# Patient Record
Sex: FEMALE | Race: WHITE | NOT HISPANIC OR LATINO | Employment: FULL TIME | URBAN - METROPOLITAN AREA
[De-identification: names, ages, dates, MRNs, and addresses within clinical notes are randomized per-mention and may not be internally consistent; named-entity substitution may affect disease eponyms.]

---

## 2023-07-19 ENCOUNTER — OFFICE VISIT (OUTPATIENT)
Dept: FAMILY MEDICINE CLINIC | Facility: CLINIC | Age: 28
End: 2023-07-19

## 2023-07-19 VITALS
WEIGHT: 111 LBS | HEART RATE: 73 BPM | SYSTOLIC BLOOD PRESSURE: 104 MMHG | TEMPERATURE: 98.4 F | DIASTOLIC BLOOD PRESSURE: 62 MMHG | OXYGEN SATURATION: 99 % | BODY MASS INDEX: 20.43 KG/M2 | RESPIRATION RATE: 17 BRPM | HEIGHT: 62 IN

## 2023-07-19 DIAGNOSIS — Z00.00 ANNUAL PHYSICAL EXAM: ICD-10-CM

## 2023-07-19 DIAGNOSIS — Z76.89 ENCOUNTER TO ESTABLISH CARE: ICD-10-CM

## 2023-07-19 DIAGNOSIS — Z11.1 SCREENING-PULMONARY TB: Primary | ICD-10-CM

## 2023-07-19 PROCEDURE — 86580 TB INTRADERMAL TEST: CPT | Performed by: NURSE PRACTITIONER

## 2023-07-19 PROCEDURE — 99385 PREV VISIT NEW AGE 18-39: CPT | Performed by: NURSE PRACTITIONER

## 2023-07-19 NOTE — PROGRESS NOTES
[facility]    NAME: Marian Ramirez  AGE: 32 y.o. SEX: female  : 1995     DATE: 2023     Assessment and Plan:     Problem List Items Addressed This Visit    None  Visit Diagnoses     Screening-pulmonary TB    -  Primary    Relevant Orders    TB Skin Test (Completed)    Encounter to establish care        Annual physical exam            RTC on Friday for PPD read w/ nurse       Immunizations and preventive care screenings were discussed with patient today. Appropriate education was printed on patient's after visit summary. Counseling:  Alcohol/drug use: discussed moderation in alcohol intake, the recommendations for healthy alcohol use, and avoidance of illicit drug use. Dental Health: discussed importance of regular tooth brushing, flossing, and dental visits. Injury prevention: discussed safety/seat belts, safety helmets, smoke detectors, carbon dioxide detectors, and smoking near bedding or upholstery. Sexual health: discussed sexually transmitted diseases, partner selection, use of condoms, avoidance of unintended pregnancy, and contraceptive alternatives. · Exercise: the importance of regular exercise/physical activity was discussed. Recommend exercise 3-5 times per week for at least 30 minutes. Depression Screening and Follow-up Plan: Patient was screened for depression during today's encounter. They screened negative with a PHQ-2 score of 0. No follow-ups on file. Chief Complaint:     Chief Complaint   Patient presents with   • New Patient Visit     Physical form jamie out       History of Present Illness:     Adult Annual Physical   Patient here for a comprehensive physical exam and to establish care. The patient recently moved to the area. 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 She has no significant PMH/PSH. Requires PPD placement for TB screening.  Reports last pap several years ago but has appointment with GYN scheduled next month. Had labs less than a year ago w/o abnormality that she is aware of, will give consent for Care Everywhere at checkout. Declines STI testing today. The patient reports no problems. Diet and Physical Activity  · Diet/Nutrition: well balanced diet. · Exercise: no formal exercise. Depression Screening  PHQ-2/9 Depression Screening    Little interest or pleasure in doing things: 0 - not at all  Feeling down, depressed, or hopeless: 0 - not at all  PHQ-2 Score: 0  PHQ-2 Interpretation: Negative depression screen       General Health  · Sleep: sleeps well. · Hearing: normal - bilateral.  · Vision: goes for regular eye exams. · Dental: regular dental visits. /GYN Health  · Last menstrual period: 7/4/2023  · Contraceptive method: none. · History of STDs?: no.     Review of Systems:     Review of Systems   Constitutional: Negative for activity change, appetite change, chills, fatigue, fever and unexpected weight change. HENT: Negative for hearing loss, nosebleeds, sinus pain, sneezing, sore throat and trouble swallowing. Eyes: Negative for photophobia and visual disturbance. Respiratory: Negative for cough, chest tightness, shortness of breath and wheezing. Cardiovascular: Negative for chest pain, palpitations and leg swelling. Gastrointestinal: Negative for abdominal pain, constipation, nausea and vomiting. Genitourinary: Negative for decreased urine volume, difficulty urinating, dysuria, flank pain, genital sores, hematuria and urgency. Musculoskeletal: Negative for back pain and gait problem. Skin: Negative for pallor, rash and wound. Neurological: Negative for dizziness, seizures, syncope, weakness, numbness and headaches. Hematological: Negative for adenopathy. Does not bruise/bleed easily. Psychiatric/Behavioral: Negative for confusion, hallucinations, self-injury, sleep disturbance and suicidal ideas.  The patient is not nervous/anxious. Past Medical History:     No past medical history on file. Past Surgical History:     No past surgical history on file. Social History:     Social History     Socioeconomic History   • Marital status: /Civil Union     Spouse name: None   • Number of children: None   • Years of education: None   • Highest education level: None   Occupational History   • None   Tobacco Use   • Smoking status: Never   • Smokeless tobacco: Never   Substance and Sexual Activity   • Alcohol use: Never   • Drug use: Never   • Sexual activity: None   Other Topics Concern   • None   Social History Narrative   • None     Social Determinants of Health     Financial Resource Strain: Low Risk  (7/19/2023)    Overall Financial Resource Strain (CARDIA)    • Difficulty of Paying Living Expenses: Not hard at all   Food Insecurity: No Food Insecurity (7/19/2023)    Hunger Vital Sign    • Worried About Running Out of Food in the Last Year: Never true    • Ran Out of Food in the Last Year: Never true   Transportation Needs: No Transportation Needs (7/19/2023)    PRAPARE - Transportation    • Lack of Transportation (Medical): No    • Lack of Transportation (Non-Medical): No   Physical Activity: Not on file   Stress: Not on file   Social Connections: Not on file   Intimate Partner Violence: Not on file   Housing Stability: Not on file      Family History:     No family history on file. Current Medications:     No current outpatient medications on file. No current facility-administered medications for this visit. Allergies:     No Known Allergies   Physical Exam:     /62 (BP Location: Left arm, Patient Position: Sitting, Cuff Size: Standard)   Pulse 73   Temp 98.4 °F (36.9 °C) (Temporal)   Resp 17   Ht 5' 2" (1.575 m)   Wt 50.3 kg (111 lb)   SpO2 99%   BMI 20.30 kg/m²     Physical Exam  Vitals and nursing note reviewed. Constitutional:       General: She is not in acute distress. Appearance: Normal appearance. She is not diaphoretic. HENT:      Head: Normocephalic. Right Ear: Tympanic membrane and external ear normal.      Left Ear: Tympanic membrane and external ear normal.      Nose: Nose normal.      Mouth/Throat:      Mouth: Mucous membranes are moist.   Eyes:      General:         Right eye: No discharge. Left eye: No discharge. Extraocular Movements: Extraocular movements intact. Conjunctiva/sclera: Conjunctivae normal.      Pupils: Pupils are equal, round, and reactive to light. Cardiovascular:      Rate and Rhythm: Normal rate and regular rhythm. Pulses: Normal pulses. Heart sounds: Normal heart sounds. Pulmonary:      Effort: Pulmonary effort is normal. No respiratory distress. Breath sounds: Normal breath sounds. Abdominal:      General: Bowel sounds are normal. There is no distension. Palpations: Abdomen is soft. Musculoskeletal:         General: Normal range of motion. Cervical back: Normal range of motion and neck supple. No rigidity. Right lower leg: No edema. Left lower leg: No edema. Lymphadenopathy:      Cervical: No cervical adenopathy. Skin:     General: Skin is warm and dry. Capillary Refill: Capillary refill takes less than 2 seconds. Findings: No rash. Neurological:      General: No focal deficit present. Mental Status: She is alert and oriented to person, place, and time.    Psychiatric:         Mood and Affect: Mood normal.         Behavior: Behavior normal.          Reggie Armas, [facility]

## 2023-07-21 ENCOUNTER — TELEPHONE (OUTPATIENT)
Dept: FAMILY MEDICINE CLINIC | Facility: CLINIC | Age: 28
End: 2023-07-21

## 2023-07-21 ENCOUNTER — CLINICAL SUPPORT (OUTPATIENT)
Dept: FAMILY MEDICINE CLINIC | Facility: CLINIC | Age: 28
End: 2023-07-21

## 2023-07-21 DIAGNOSIS — Z11.1 ENCOUNTER FOR PPD SKIN TEST READING: Primary | ICD-10-CM

## 2023-07-21 LAB
INDURATION: 0 MM
TB SKIN TEST: NEGATIVE

## 2023-09-05 ENCOUNTER — TELEPHONE (OUTPATIENT)
Dept: OBGYN CLINIC | Facility: CLINIC | Age: 28
End: 2023-09-05

## 2023-09-05 NOTE — TELEPHONE ENCOUNTER
Patient called and left message requesting a New OB appt. Called patient back and left her a voicemail requesting a call back for NOB appt and her LMP.

## 2023-09-05 NOTE — TELEPHONE ENCOUNTER
Patient called, left message on answering machine, hx of miscarriage in April 2023. NOB is scheduled.

## 2023-09-07 ENCOUNTER — NURSE TRIAGE (OUTPATIENT)
Dept: OTHER | Facility: OTHER | Age: 28
End: 2023-09-07

## 2023-09-08 ENCOUNTER — APPOINTMENT (OUTPATIENT)
Dept: LAB | Facility: HOSPITAL | Age: 28
End: 2023-09-08
Payer: COMMERCIAL

## 2023-09-08 DIAGNOSIS — O20.9 BLEEDING IN EARLY PREGNANCY: ICD-10-CM

## 2023-09-08 DIAGNOSIS — Z87.59 HISTORY OF MISCARRIAGE: ICD-10-CM

## 2023-09-08 DIAGNOSIS — O20.9 BLEEDING IN EARLY PREGNANCY: Primary | ICD-10-CM

## 2023-09-08 LAB
ABO GROUP BLD: NORMAL
B-HCG SERPL-ACNC: 26 MIU/ML (ref 0–5)
BLD GP AB SCN SERPL QL: NEGATIVE
PROGEST SERPL-MCNC: 0.38 NG/ML
RH BLD: POSITIVE
SPECIMEN EXPIRATION DATE: NORMAL

## 2023-09-08 PROCEDURE — 84702 CHORIONIC GONADOTROPIN TEST: CPT

## 2023-09-08 PROCEDURE — 86900 BLOOD TYPING SEROLOGIC ABO: CPT

## 2023-09-08 PROCEDURE — 86850 RBC ANTIBODY SCREEN: CPT

## 2023-09-08 PROCEDURE — 84144 ASSAY OF PROGESTERONE: CPT

## 2023-09-08 PROCEDURE — 86901 BLOOD TYPING SEROLOGIC RH(D): CPT

## 2023-09-08 PROCEDURE — 36415 COLL VENOUS BLD VENIPUNCTURE: CPT

## 2023-09-08 NOTE — TELEPHONE ENCOUNTER
Called and spoke to patient. Pt reports cramping is intermittent, coming in waves, bleeding is less at this time. Patient states last night the cramps were worse, doubling over, reports now they are much better more like menstrual cramps located all throughout, centralized per pt. Patient states bleeding last night was heavy but not super heavy, has lessened now and hasn't had to change her pad for the last few hours. Pt denies dizziness, lightheadedness, sob, fever/chills at this time. States last night had some chills but then went away. Pt states she experienced a miscarriage the end of April and very similar symptoms at this time. Pt reports her blood type is A positive, no labs noted in chart. Pt agrees and understands importance of completing blood work at this time, T&S and hcg, verbalizes understanding of importance to monitor hcg levels to be sure decreasing and return to normal non pregnant level. [facility] and will complete today and await further recommendations for additional blood work to be completed. Reviewed precautions with pt, aware tylenol for cramps, avoid ibuprofen, if worsening uncontrolled cramps, especially unilateral to call or to ED for evaluation. Pt verbalizes understanding if bleeding increases, changing fully saturated pad every hour, any dizziness, lightheaded, sob, fever/chills to call or to ED for evaluation.

## 2023-09-08 NOTE — TELEPHONE ENCOUNTER
Regarding: clotting, bleeding at 6 weeks  ----- Message from Neri Laguerre sent at 9/7/2023 11:43 PM EDT -----  " I am about 6 weeks pregnant and I think I am miscarrying.  I woke up to bright red blood and clots."

## 2023-09-08 NOTE — TELEPHONE ENCOUNTER
Reason for Disposition  • [1] Intermittent lower abdominal pain (e.g., cramping) AND [2] present > 24 hours    Answer Assessment - Initial Assessment Questions  1. ONSET: "When did this bleeding start?"        One hour ago    2. DESCRIPTION: "Describe the bleeding that you are having." "How much bleeding is there?"     - SPOTTING: spotting, or pinkish / brownish mucous discharge; does not fill panti-liner or pad     - MILD:  less than 1 pad / hour; less than patient's usual menstrual bleeding    - MODERATE: 1-2 pads / hour; 1 menstrual cup every 6 hours; small-medium blood clots (e.g., pea, grape, small coin)    - SEVERE: soaking 2 or more pads/hour for 2 or more hours; 1 menstrual cup every 2 hours; bleeding not contained by pads or continuous red blood from vagina; large blood clots (e.g., golf ball, large coin)       Mild, small amount of pea sized clots    3. ABDOMINAL PAIN SEVERITY: If present, ask: "How bad is it?"  (e.g., Scale 1-10; mild, moderate, or severe)    - MILD (1-3): doesn't interfere with normal activities, abdomen soft and not tender to touch     - MODERATE (4-7): interferes with normal activities or awakens from sleep, tender to touch     - SEVERE (8-10): excruciating pain, doubled over, unable to do any normal activities      Mild (cramping)    4. PREGNANCY: "Do you know how many weeks or months pregnant you are?" "When was the first day of your last normal menstrual period?"      approx 6 weeks    5. HEMODYNAMIC STATUS: "Are you weak or feeling lightheaded?" If Yes, ask: "Can you stand and walk normally?"       Denies, is able to stand and walk normally    6. OTHER SYMPTOMS: "What other symptoms are you having with the bleeding?" (e.g., passed tissue, vaginal discharge, fever, menstrual-type cramps)      Increasing cramping since last evening, chills but denies fever    Patient reports that she has had a previous miscarriage and her symptoms feel similar.       Protocol disposition discussed with pt. Home care advice given. Reviewed ER precautions. Pt verbalized understanding and was appreciative.  She denied having any further questions or concerns.     Protocols used: PREGNANCY - VAGINAL BLEEDING LESS THAN 20 WEEKS EGA-ADULT-

## 2023-09-10 ENCOUNTER — LAB (OUTPATIENT)
Dept: LAB | Facility: HOSPITAL | Age: 28
End: 2023-09-10
Payer: COMMERCIAL

## 2023-09-10 DIAGNOSIS — Z87.59 PERSONAL HISTORY OF TROPHOBLASTIC DISEASE: ICD-10-CM

## 2023-09-10 DIAGNOSIS — O20.9 HEMORRHAGE BEFORE 22 WEEKS GESTATION: ICD-10-CM

## 2023-09-10 LAB — B-HCG SERPL-ACNC: 7 MIU/ML (ref 0–5)

## 2023-09-10 PROCEDURE — 36415 COLL VENOUS BLD VENIPUNCTURE: CPT

## 2023-09-10 PROCEDURE — 84702 CHORIONIC GONADOTROPIN TEST: CPT

## 2023-09-29 ENCOUNTER — OFFICE VISIT (OUTPATIENT)
Dept: URGENT CARE | Age: 28
End: 2023-09-29
Payer: COMMERCIAL

## 2023-09-29 VITALS
OXYGEN SATURATION: 99 % | TEMPERATURE: 98.2 F | RESPIRATION RATE: 18 BRPM | DIASTOLIC BLOOD PRESSURE: 74 MMHG | SYSTOLIC BLOOD PRESSURE: 108 MMHG | HEART RATE: 87 BPM

## 2023-09-29 DIAGNOSIS — J06.9 VIRAL URI: ICD-10-CM

## 2023-09-29 DIAGNOSIS — J04.0 ACUTE LARYNGITIS: Primary | ICD-10-CM

## 2023-09-29 PROCEDURE — 99204 OFFICE O/P NEW MOD 45 MIN: CPT | Performed by: EMERGENCY MEDICINE

## 2023-09-29 NOTE — PROGRESS NOTES
[facility]        NAME: Page Flanagan is a 32 y.o. female  : 1995    MRN: [id number]  DATE: 2023  TIME: 4:57 PM    Assessment and Plan   Acute laryngitis [J04.0]  1. Acute laryngitis  dexamethasone oral liquid 10 mg 1 mL      2. Viral URI          -Presentation likely secondary to acute laryngitis secondary to viral illness with likely components of overuse given job a   -Advised voice rest as able, salt water gargles and honey for symptomatic relief, patient still experiencing mild sore throat, will give Decadron in clinic  -Anticipated guidance given regarding continued supportive care at home to include use of over-the-counter cold and flu medications and decongestants, use of oxymetazoline and saline nasal spray  -Advised patient to follow-up closely with her PCP, OB/GYN as directed  -Advised patient to seek care in ED if symptoms worsen to include difficulty swallowing, difficulty breathing, or chest pain or shortness of breath  -All questions answered at bedside, patient amenable to plan and voiced understanding    Patient Instructions       Follow up with PCP in 3-5 days. Proceed to  ER if symptoms worsen. Chief Complaint     Chief Complaint   Patient presents with   • Sore Throat     Patient relates Friday started cold-like symptoms. Has felt better, but still has congestion, dry cough, loss of voice. History of Present Illness       18-year-old female with history of recent spontaneous AB presents with a chief complaint of loss of voice and persistent nasal congestion and postnasal drip. Patient states that symptoms initially started 1 week ago. Patient works as a  for [employer] and states that she "uses her voice all day". She states that several kids in school have been sick with similar symptoms.   She states that initially she had generalized malaise, body aches and a sore throat which have since improved from initial onset of symptoms. She states that approximately 3 days ago she noticed that she began to lose her voice. She denies difficulty breathing, drooling or difficulty swallowing. Has not tried taking any over-the-counter medication for symptoms. She states that she is also experiencing a dry nonproductive cough which is worse when lying down and upon awakening in the morning. She denies chest pain or shortness of breath. Patient states that earlier in the month she suffered a spontaneous  for which she has been followed closely by her OB/GYN. She denies current abdominal pain, cramping or vaginal bleeding. Sore Throat   This is a new problem. The current episode started in the past 7 days. The problem has been gradually improving. There has been no fever. The pain is at a severity of 4/10. The pain is moderate. Associated symptoms include congestion, coughing and a hoarse voice. Pertinent negatives include no abdominal pain, diarrhea, drooling, ear discharge, ear pain, headaches, plugged ear sensation, neck pain, shortness of breath, stridor, swollen glands, trouble swallowing or vomiting. She has tried nothing for the symptoms. Review of Systems   Review of Systems   Constitutional: Negative for activity change, appetite change, chills, diaphoresis, fatigue and fever. HENT: Positive for congestion, hoarse voice, postnasal drip, rhinorrhea, sinus pressure, sinus pain, sneezing and sore throat. Negative for dental problem, drooling, ear discharge, ear pain, facial swelling, hearing loss, mouth sores, nosebleeds, tinnitus, trouble swallowing and voice change. Eyes: Negative for photophobia, pain, discharge, redness, itching and visual disturbance. Respiratory: Positive for cough. Negative for apnea, choking, chest tightness, shortness of breath, wheezing and stridor. Cardiovascular: Negative for chest pain, palpitations and leg swelling.    Gastrointestinal: Negative for abdominal pain, diarrhea, nausea and vomiting. Genitourinary: Negative for dysuria, hematuria, pelvic pain, vaginal bleeding, vaginal discharge and vaginal pain. Musculoskeletal: Negative for arthralgias, back pain, gait problem, joint swelling, myalgias, neck pain and neck stiffness. Skin: Negative for color change and rash. Neurological: Negative for dizziness, tremors, seizures, syncope, facial asymmetry, speech difficulty, weakness, light-headedness, numbness and headaches. All other systems reviewed and are negative. Current Medications     No current outpatient medications on file. No current facility-administered medications for this visit. Current Allergies     Allergies as of 09/29/2023   • (No Known Allergies)            The following portions of the patient's history were reviewed and updated as appropriate: allergies, current medications, past family history, past medical history, past social history, past surgical history and problem list.     No past medical history on file. No past surgical history on file. No family history on file. Medications have been verified. Objective   /74   Pulse 87   Temp 98.2 °F (36.8 °C)   Resp 18   SpO2 99%   No LMP recorded. Physical Exam     Physical Exam  Vitals and nursing note reviewed. Constitutional:       General: She is not in acute distress. Appearance: Normal appearance. She is not ill-appearing, toxic-appearing or diaphoretic. HENT:      Head: Normocephalic and atraumatic. Right Ear: Tympanic membrane, ear canal and external ear normal. No drainage, swelling or tenderness. No middle ear effusion. There is no impacted cerumen. Tympanic membrane is not erythematous. Left Ear: Tympanic membrane, ear canal and external ear normal. No drainage, swelling or tenderness. No middle ear effusion. Tympanic membrane is not erythematous. Nose: Congestion and rhinorrhea present.       Mouth/Throat: Mouth: Mucous membranes are moist. No oral lesions. Pharynx: Uvula midline. Posterior oropharyngeal erythema present. No pharyngeal swelling or oropharyngeal exudate. Tonsils: 0 on the right. 0 on the left. Eyes:      General:         Right eye: No discharge. Extraocular Movements: Extraocular movements intact. Right eye: Normal extraocular motion. Left eye: Normal extraocular motion. Conjunctiva/sclera: Conjunctivae normal.      Pupils: Pupils are equal, round, and reactive to light. Neck:      Thyroid: No thyromegaly. Cardiovascular:      Rate and Rhythm: Normal rate and regular rhythm. Pulses: Normal pulses. Carotid pulses are 2+ on the right side and 2+ on the left side. Radial pulses are 2+ on the right side and 2+ on the left side. Heart sounds: No murmur heard. Pulmonary:      Effort: Pulmonary effort is normal. No respiratory distress. Breath sounds: Normal breath sounds. No stridor. No wheezing, rhonchi or rales. Chest:      Chest wall: No tenderness. Abdominal:      General: Abdomen is flat. There is no distension. Palpations: There is no mass. Tenderness: There is no abdominal tenderness. There is no left CVA tenderness, guarding or rebound. Hernia: No hernia is present. Musculoskeletal:      Cervical back: Normal range of motion and neck supple. Right lower leg: No edema. Left lower leg: No edema. Lymphadenopathy:      Cervical: No cervical adenopathy. Skin:     General: Skin is warm and dry. Coloration: Skin is not pale. Findings: No rash. Neurological:      General: No focal deficit present. Mental Status: She is alert and oriented to person, place, and time. Cranial Nerves: No cranial nerve deficit. Sensory: No sensory deficit. Motor: No weakness.       Coordination: Coordination normal.      Gait: Gait normal.      Deep Tendon Reflexes: Reflexes normal.   Psychiatric: Mood and Affect: Mood normal.         Behavior: Behavior normal.

## 2023-09-29 NOTE — PROGRESS NOTES
SUBJECTIVE:   Toro Hoang is a 32 y.o. female presenting for well adolescent and school/sports physical. She is seen today {alone or w :[id number]}. She is participating in *** at *** high school. PMH: No asthma, diabetes, heart disease, epilepsy or orthopedic problems in the past.  ROS: {adol ros:488772}  No problems during sports participation in the past.   Social History: Denies the use of tobacco, alcohol or street drugs. Parental concerns: ***    OBJECTIVE:   General appearance: WDWN female. ENT: ears and throat normal  Eyes: Vision : Right: 20/***; Left: 20/*** {w-w/o:089179} correction; PERRLA; EOMI  Neck: supple; thyroid normal; no adenopathy  Lungs: CTAB, no wheezing or stridor  Heart: no M/R/G; RRR; normal S1 and S2  Abdomen: no masses palpated, no organomegaly or tenderness  Genitalia: ***  Spine: normal, no scoliosis  Skin: Normal with {gen severity:568093} acne noted. Neuro: CN II-XII grossly intact; DTRs normal & symmetrical; Romberg negative  Extremities: strength equal bilaterally 5/5 UE & LE    ASSESSMENT:   Well adolescent female    PLAN:   Cleared for school and sports activities.

## 2023-10-05 ENCOUNTER — OFFICE VISIT (OUTPATIENT)
Dept: OBGYN CLINIC | Facility: CLINIC | Age: 28
End: 2023-10-05
Payer: COMMERCIAL

## 2023-10-05 VITALS
WEIGHT: 112 LBS | BODY MASS INDEX: 20.61 KG/M2 | SYSTOLIC BLOOD PRESSURE: 110 MMHG | DIASTOLIC BLOOD PRESSURE: 70 MMHG | HEIGHT: 62 IN

## 2023-10-05 DIAGNOSIS — Z87.59 HISTORY OF MISCARRIAGE: Primary | ICD-10-CM

## 2023-10-05 PROCEDURE — 99213 OFFICE O/P EST LOW 20 MIN: CPT | Performed by: OBSTETRICS & GYNECOLOGY

## 2023-10-05 NOTE — PROGRESS NOTES
Subjective:     Jayme Steen is a 32 y.o.  female who presents to discuss her recent miscarriages. She had an early miscarriage in 2023. She reports that she was more "symptomatic" with that pregnancy. She passed all of the POCs naturally. She had another miscarriage in September at approximately 6 weeks with very low HCG levels (26 -> 7). She reports that her bleeding has since stopped. She denies any significant past medical or surgical history. Her  has a history of anemia. Patient Active Problem List   Diagnosis   • History of miscarriage     Past Medical History:   Diagnosis Date   • Miscarriage 23, 23    Spontaneous     History reviewed. No pertinent surgical history. OB History    Para Term  AB Living   2       2     SAB IAB Ectopic Multiple Live Births   2              # Outcome Date GA Lbr [place]/2nd Weight Sex Delivery Anes PTL Lv   2 SAB            1 SAB              Objective:    Vitals: Blood pressure 110/70, height 5' 2" (1.575 m), weight 50.8 kg (112 lb), last menstrual period 2023. Body mass index is 20.49 kg/m². Physical Exam  Vitals reviewed. Constitutional:       General: She is not in acute distress. Appearance: Normal appearance. She is well-developed. She is not ill-appearing, toxic-appearing or diaphoretic. Cardiovascular:      Rate and Rhythm: Normal rate. Pulmonary:      Effort: Pulmonary effort is normal. No respiratory distress. Skin:     General: Skin is warm and dry. Neurological:      Mental Status: She is alert and oriented to person, place, and time. Psychiatric:         Mood and Affect: Mood normal.         Behavior: Behavior normal.         Assessment/Plan:    Problem List Items Addressed This Visit        Unprioritized    History of miscarriage - Primary     We reviewed the statistics that 1 in 4 women have a miscarriage and that 10% of all clinically recognized pregnancies end in miscarriages.  80% of miscarriages are in the first trimester. We reviewed that appropximately 50% of all cases of early pregnancy loss are due to fetal chromosomal abnormalities. We reviewed the following possible causes of miscarriage including but not limited to genetic, anatomic, endocrine, thrombophilia, immunologic, and environmental factors. - Genetic: Statistics were reviewed. Patient and FOB interested in genetic counseling and referral was given. - Anatomic: Ultrasound ordered to assess anatomy.   - Endocrine: TSH ordered  - Thrombophilia: The only inherited thrombophilia associated specifically with RPL is antiphospolipid syndrome. Therefore, testing will be completed at this time. - Immunologic: Encouraged vaccination and prenatal panel ordered  - Environmental factors: Reviewed safety and PNV    Labs: Prenatal panel discussed  Immunity: Rubella Unknown (ordered); Varicella unknown (ordered_   PNV: Continue PNV  Precautions: Aware to avoid tobacco and etoh and drugs when trying to conceive. Medications: We reviewed pts current medication list- currently on no medications;  Reviewed starting ASA late 1st trimester; Reviewed possible progesterone supplementation in early pregnancy  COVID: Vaccinated  Flu vaccine: Vaccinated  Conception timing: Reviewed Ovulation timing and recommended intercourse timing  Prenatal care: Call with + UPT  Maintenance: Reviewed healthy diet and exercise           Relevant Orders    TSH, 3rd generation with Free T4 reflex    CBC and differential    RPR-Syphilis Screening (Total Syphilis IGG/IGM)    Hepatitis B surface antigen    Hepatitis C antibody    Type and screen    Hepatitis B surface antibody    HIV 1/2 AG/AB w Reflex [facility] for 2 yr old and above    Rubella antibody, IgG    Urinalysis with microscopic    Urine culture    Anemia Panel w/Reflex, OB    Varicella zoster antibody, IgG    US pelvis complete w transvaginal    Ambulatory Referral to Maternal Fetal Medicine    Lupus anticoagulant Anticardiolipin Ab, IgG/M, Qn    Beta-2 glycoprotein antibodies       Molly Tijerina MD  10/6/2023  8:21 PM

## 2023-10-06 PROBLEM — Z87.59 HISTORY OF MISCARRIAGE: Status: ACTIVE | Noted: 2023-10-06

## 2023-10-07 ENCOUNTER — APPOINTMENT (OUTPATIENT)
Dept: LAB | Facility: HOSPITAL | Age: 28
End: 2023-10-07
Payer: COMMERCIAL

## 2023-10-07 DIAGNOSIS — Z87.59 HISTORY OF MISCARRIAGE: ICD-10-CM

## 2023-10-07 LAB
ABO GROUP BLD: NORMAL
BACTERIA UR QL AUTO: ABNORMAL /HPF
BASOPHILS # BLD AUTO: 0.04 THOUSANDS/ÂΜL (ref 0–0.1)
BASOPHILS NFR BLD AUTO: 1 % (ref 0–1)
BILIRUB UR QL STRIP: NEGATIVE
BLD GP AB SCN SERPL QL: NEGATIVE
CLARITY UR: CLEAR
COLOR UR: ABNORMAL
EOSINOPHIL # BLD AUTO: 0.13 THOUSAND/ÂΜL (ref 0–0.61)
EOSINOPHIL NFR BLD AUTO: 3 % (ref 0–6)
ERYTHROCYTE [DISTWIDTH] IN BLOOD BY AUTOMATED COUNT: 12 % (ref 11.6–15.1)
GLUCOSE UR STRIP-MCNC: NEGATIVE MG/DL
HCT VFR BLD AUTO: 41.6 % (ref 34.8–46.1)
HGB BLD-MCNC: 14 G/DL (ref 11.5–15.4)
HGB UR QL STRIP.AUTO: NEGATIVE
IMM GRANULOCYTES # BLD AUTO: 0.01 THOUSAND/UL (ref 0–0.2)
IMM GRANULOCYTES NFR BLD AUTO: 0 % (ref 0–2)
KETONES UR STRIP-MCNC: NEGATIVE MG/DL
LEUKOCYTE ESTERASE UR QL STRIP: NEGATIVE
LYMPHOCYTES # BLD AUTO: 2.23 THOUSANDS/ÂΜL (ref 0.6–4.47)
LYMPHOCYTES NFR BLD AUTO: 44 % (ref 14–44)
MCH RBC QN AUTO: 30.2 PG (ref 26.8–34.3)
MCHC RBC AUTO-ENTMCNC: 33.7 G/DL (ref 31.4–37.4)
MCV RBC AUTO: 90 FL (ref 82–98)
MONOCYTES # BLD AUTO: 0.57 THOUSAND/ÂΜL (ref 0.17–1.22)
MONOCYTES NFR BLD AUTO: 12 % (ref 4–12)
NEUTROPHILS # BLD AUTO: 1.96 THOUSANDS/ÂΜL (ref 1.85–7.62)
NEUTS SEG NFR BLD AUTO: 40 % (ref 43–75)
NITRITE UR QL STRIP: NEGATIVE
NON-SQ EPI CELLS URNS QL MICRO: ABNORMAL /HPF
NRBC BLD AUTO-RTO: 0 /100 WBCS
PH UR STRIP.AUTO: 7 [PH]
PLATELET # BLD AUTO: 249 THOUSANDS/UL (ref 149–390)
PMV BLD AUTO: 10.5 FL (ref 8.9–12.7)
PROT UR STRIP-MCNC: NEGATIVE MG/DL
RBC # BLD AUTO: 4.64 MILLION/UL (ref 3.81–5.12)
RBC #/AREA URNS AUTO: ABNORMAL /HPF
RH BLD: POSITIVE
RUBV IGG SERPL IA-ACNC: 51.6 IU/ML
SP GR UR STRIP.AUTO: 1.01 (ref 1–1.04)
SPECIMEN EXPIRATION DATE: NORMAL
TSH SERPL DL<=0.05 MIU/L-ACNC: 2.04 UIU/ML (ref 0.45–4.5)
UROBILINOGEN UA: NEGATIVE MG/DL
WBC # BLD AUTO: 4.94 THOUSAND/UL (ref 4.31–10.16)
WBC #/AREA URNS AUTO: ABNORMAL /HPF

## 2023-10-07 PROCEDURE — 85670 THROMBIN TIME PLASMA: CPT

## 2023-10-07 PROCEDURE — 86850 RBC ANTIBODY SCREEN: CPT

## 2023-10-07 PROCEDURE — 86762 RUBELLA ANTIBODY: CPT

## 2023-10-07 PROCEDURE — 87389 HIV-1 AG W/HIV-1&-2 AB AG IA: CPT

## 2023-10-07 PROCEDURE — 86900 BLOOD TYPING SEROLOGIC ABO: CPT

## 2023-10-07 PROCEDURE — 87086 URINE CULTURE/COLONY COUNT: CPT

## 2023-10-07 PROCEDURE — 36415 COLL VENOUS BLD VENIPUNCTURE: CPT

## 2023-10-07 PROCEDURE — 85025 COMPLETE CBC W/AUTO DIFF WBC: CPT

## 2023-10-07 PROCEDURE — 86146 BETA-2 GLYCOPROTEIN ANTIBODY: CPT

## 2023-10-07 PROCEDURE — 87340 HEPATITIS B SURFACE AG IA: CPT

## 2023-10-07 PROCEDURE — 85613 RUSSELL VIPER VENOM DILUTED: CPT

## 2023-10-07 PROCEDURE — 84443 ASSAY THYROID STIM HORMONE: CPT | Performed by: OBSTETRICS & GYNECOLOGY

## 2023-10-07 PROCEDURE — 85705 THROMBOPLASTIN INHIBITION: CPT

## 2023-10-07 PROCEDURE — 85732 THROMBOPLASTIN TIME PARTIAL: CPT

## 2023-10-07 PROCEDURE — 86803 HEPATITIS C AB TEST: CPT

## 2023-10-07 PROCEDURE — 86901 BLOOD TYPING SEROLOGIC RH(D): CPT

## 2023-10-07 PROCEDURE — 86780 TREPONEMA PALLIDUM: CPT

## 2023-10-07 PROCEDURE — 86787 VARICELLA-ZOSTER ANTIBODY: CPT

## 2023-10-07 PROCEDURE — 81001 URINALYSIS AUTO W/SCOPE: CPT

## 2023-10-07 PROCEDURE — 86706 HEP B SURFACE ANTIBODY: CPT

## 2023-10-07 PROCEDURE — 86147 CARDIOLIPIN ANTIBODY EA IG: CPT

## 2023-10-07 NOTE — ASSESSMENT & PLAN NOTE
We reviewed the statistics that 1 in 4 women have a miscarriage and that 10% of all clinically recognized pregnancies end in miscarriages. 80% of miscarriages are in the first trimester. We reviewed that appropximately 50% of all cases of early pregnancy loss are due to fetal chromosomal abnormalities. We reviewed the following possible causes of miscarriage including but not limited to genetic, anatomic, endocrine, thrombophilia, immunologic, and environmental factors. - Genetic: Statistics were reviewed. Patient and FOB interested in genetic counseling and referral was given. - Anatomic: Ultrasound ordered to assess anatomy.   - Endocrine: TSH ordered  - Thrombophilia: The only inherited thrombophilia associated specifically with RPL is antiphospolipid syndrome. Therefore, testing will be completed at this time. - Immunologic: Encouraged vaccination and prenatal panel ordered  - Environmental factors: Reviewed safety and PNV    Labs: Prenatal panel discussed  Immunity: Rubella Unknown (ordered); Varicella unknown (ordered_   PNV: Continue PNV  Precautions: Aware to avoid tobacco and etoh and drugs when trying to conceive. Medications: We reviewed pts current medication list- currently on no medications;  Reviewed starting ASA late 1st trimester; Reviewed possible progesterone supplementation in early pregnancy  COVID: Vaccinated  Flu vaccine: Vaccinated  Conception timing: Reviewed Ovulation timing and recommended intercourse timing  Prenatal care: Call with + UPT  Maintenance: Reviewed healthy diet and exercise

## 2023-10-08 LAB
BACTERIA UR CULT: NORMAL
HBV SURFACE AB SER-ACNC: 26.9 MIU/ML
HBV SURFACE AG SER QL: NORMAL
HCV AB SER QL: NORMAL
HIV 1+2 AB+HIV1 P24 AG SERPL QL IA: NORMAL
HIV 2 AB SERPL QL IA: NORMAL
HIV1 AB SERPL QL IA: NORMAL
HIV1 P24 AG SERPL QL IA: NORMAL
TREPONEMA PALLIDUM IGG+IGM AB [PRESENCE] IN SERUM OR PLASMA BY IMMUNOASSAY: NORMAL
VZV IGG SER QL IA: NORMAL

## 2023-10-09 LAB
APTT SCREEN TO CONFIRM RATIO: 1.1 RATIO (ref 0–1.34)
CONFIRM APTT/NORMAL: 35 SEC (ref 0–47.6)
LA PPP-IMP: NORMAL
SCREEN APTT: 33.2 SEC (ref 0–43.5)
SCREEN DRVVT: 33.3 SEC (ref 0–47)
THROMBIN TIME: 19.3 SEC (ref 0–23)

## 2023-10-10 LAB
B2 GLYCOPROT1 IGA SERPL IA-ACNC: 0.7
B2 GLYCOPROT1 IGG SERPL IA-ACNC: <0.8
B2 GLYCOPROT1 IGM SERPL IA-ACNC: <2.4
CARDIOLIPIN IGA SER IA-ACNC: 1.3
CARDIOLIPIN IGG SER IA-ACNC: 3.3
CARDIOLIPIN IGM SER IA-ACNC: 1.7

## 2023-10-11 ENCOUNTER — HOSPITAL ENCOUNTER (OUTPATIENT)
Dept: ULTRASOUND IMAGING | Facility: HOSPITAL | Age: 28
Discharge: HOME | End: 2023-10-11
Payer: COMMERCIAL

## 2023-10-11 DIAGNOSIS — Z87.59 HISTORY OF MISCARRIAGE: ICD-10-CM

## 2023-10-11 PROCEDURE — 76856 US EXAM PELVIC COMPLETE: CPT

## 2023-10-11 PROCEDURE — 76830 TRANSVAGINAL US NON-OB: CPT

## 2023-10-17 ENCOUNTER — TELEMEDICINE (OUTPATIENT)
Facility: HOSPITAL | Age: 28
End: 2023-10-17

## 2023-10-17 DIAGNOSIS — Z31.69 ENCOUNTER FOR PRECONCEPTION CONSULTATION: Primary | ICD-10-CM

## 2023-10-17 DIAGNOSIS — Z87.59 HISTORY OF MISCARRIAGE: ICD-10-CM

## 2023-10-17 DIAGNOSIS — Z31.5 ENCOUNTER FOR PROCREATIVE GENETIC COUNSELING: ICD-10-CM

## 2023-10-17 PROCEDURE — NC001 PR NO CHARGE

## 2023-10-17 NOTE — PROGRESS NOTES
Genetic Counseling Note    Appointment Date:  10/17/2023  Referred By: Carmen Sanchez MD  YOB: 1995  Partner:  Ashley Perrin  Indication for Visit:   Preconception, history of miscarriage  Pregnancy History:   Estimated Date of Delivery: N/A  Estimated Gestational Age: N/A        Virtual Regular Visit    Verification of patient location:    Patient is located at Home in the following state in which I hold an active license [place]      Assessment/Plan:    Problem List Items Addressed This Visit          Other    History of miscarriage     Other Visit Diagnoses       Encounter for preconception consultation    -  Primary    Encounter for procreative genetic counseling                     Reason for visit is   Chief Complaint   Patient presents with    Virtual Regular Visit          Encounter provider [facility]    Provider located at [address] [place] [address] [address] [facility]      Recent Visits  No visits were found meeting these conditions. Showing recent visits within past 7 days and meeting all other requirements  Today's Visits  Date Type Provider Dept   10/17/23 Telemedicine [facility]    Showing today's visits and meeting all other requirements  Future Appointments  No visits were found meeting these conditions. Showing future appointments within next 150 days and meeting all other requirements       The patient was identified by name and date of birth. Astrid Plummer was informed that this is a telemedicine visit and that the visit is being conducted through the [employer]. She agrees to proceed. My office door was closed. The patient was notified the following individuals were present in the room: Pippa Garzon, co-genetic counselor. She acknowledged consent and understanding of privacy and security of the video platform.  The patient has agreed to participate and understands they can discontinue the visit at any time. Edmund Boyd is a 32 y.o. female who presented with her partner Fabi Hodges for a preconception genetic counseling visit to discuss her history of miscarriage. Edmund Boyd reports experiencing a pregnancy loss in April 2023 at approximately 6 weeks gestation based on LMP. She states that hCG testing confirmed the presence of a pregnancy and its loss. In September 2023 she experienced a second pregnancy loss at what she estimates to be an earlier gestational age than her first. hCG analysis confirmed a very early pregnancy followed by a loss. Based on this history, the patient's OB provider ordered extensive testing to look into possible causes of recurrent miscarriage. Edmund Boyd reports that she has not yet heard back about much of her testing, but so far she is unaware of a causative etiology being discovered. We discussed that recurrent pregnancy loss is classically defined as three or more first trimester miscarriages, although some authorities now define it as two. It is estimated that about 0.5-3.0% of women have a history of recurrent miscarriage. In about 50% of couples with recurrent miscarriage, the etiology remains unknown despite a thorough evaluation and is therefore classified as idiopathic. Multiple etiologies for recurrent spontaneous abortions were discussed with the patient. These include endocrine (diabetes, polycystic ovarian syndrome), environmental (smoking), immunologic (antiphospholipid syndrome), maternal factors (uterine or cervical anomaly), and genetic causes (chromosomal abnormalities, alpha thalassemia major, thrombophilia, etc). We reviewed that as genetic testing was not able to be performed on either of Ellyn's pregnancy losses, we don't know that genetic/chromosomal abnormalities were responsible.  Nevertheless, from a genetic standpoint, there is a possibility that either the patient or her partner carry a balanced chromosomal translocation which could be passed on to a pregnancy in an unbalanced form and thus result either in a miscarriage or a fetus affected with a chromosomal disorder. If the patient experiences a third pregnancy loss, the chance that the patient or her partner is a carrier is 3-5%. Individuals with reciprocal balanced translocations and Robertsonian translocations are typically unaffected because they have all of their genetic information present, just in a different arrangement than what is typically seen. If this translocation is passed on there is a possibility for the pregnancy to inherit the balanced translocation and therefore be predicted to be unaffected. There is also the possibility for the pregnancy to inherit an unbalanced arrangement and be at risk for pregnancy loss, congenital anomalies and/or intellectual disability/developmental delays. Lastly, there is the possibility for the pregnancy to not inherit the translocation and not be affected. Peripheral chromosome analysis was offered to both Steve Garrison and Marcia Koo to investigate the possibility of a translocation. The couple declined peripheral chromosome analysis at this time but may consider the option in the future. Histories for the patient and her partner's family were taken during our session. Further review of family history for the patient and her partner was noncontributory. The family history was not significant for genetic diseases or disorders, intellectual disability, birth defects, or consanguinity. Patient reports being of [place] descent and that her  is of [place], [place], and Ashkenazi Mu-ism descent. She denies having known Ashkenazi Mu-ism ancestry. We reviewed that certain diseases are more common in individuals of Ashkenazi Mu-ism descent and therefore couples are at an increased risk of offspring having one of these conditions.  Many of these disorders are lethal in childhood or carry a significant risk for morbidity and mortality. These disease are inherited in an autosomal recessive pattern, thus both parents must carry a gene mutation in order for there to be a risk for the pregnancy to be affected. The benefits and limitations of Cystic fibrosis (CF), Spinal muscular atrophy (SMA), and hemoglobinopathy carrier screening were discussed. Carrier screening based on Ashkenazi Orthodoxy ethnic background was offered to the patient's . We reviewed the option of expanded carrier screening. We discussed that the panels can test for carrier status for over 500 autosomal recessive and X-linked diseases. All of the diseases included on the panel are life threatening, life limiting, or have treatments available. After reviewing the benefits and limitations of carrier screening the couple elected to further consider their options. A [employer] message will be sent with different carrier screening options, as well as billing information. As this was a preconception consult we also discussed maternal age related risk for aneuploidies. The risk of Down syndrome at age 34 at delivery is 1/994 and the risk for any chromosomal abnormality at this age is 1/385. We reviewed the benefits and limitations of maternal serum screening for aneuploidies. We discussed that definitive diagnosis is only available by CVS or amniocentesis, and the limitations and potential risks with the procedures. We also discussed the availability of preimplantation genetic testing (PGT) with in vitro fertilization (IVF). PGT is a reproductive technology used with an IVF cycle for diagnosis of a genetic disease in early embryos prior to implantation and pregnancy. We discussed some of the risks, benefits, and limitations of PGT including multiple IVF cycles, cost involved and possibility of an unaffected child. PGT is typically available for familial mutations and other aneuploidies.      Lastly, we discussed the fact that everyone in the general population regardless of age, family history, or medical background has approximately a 3-5% risk of having a child with some type of congenital anomaly, genetic disease or intellectual disability. Currently there are no tests available to rule out all birth defects or health problems. Marianela Raphael was provided with our contact information. I encouraged her to call with any questions or concerns. Plan/Tests Ordered:  1) Patient and her partner declined peripheral chromosome analysis at this time. 2) Patient will consider her carrier screening options. Time spent with Genetic Counselor: 37 minutes       HPI     Past Medical History:   Diagnosis Date    Miscarriage 23, 23    Spontaneous       No past surgical history on file. Current Outpatient Medications   Medication Sig Dispense Refill    Prenatal Multivit-Min-Fe-FA (PRE-GRAYSON PO) Take by mouth       No current facility-administered medications for this visit. No Known Allergies    Review of Systems    Video Exam    There were no vitals filed for this visit.     Physical Exam     Visit Time  Total Visit Duration: 37 minutes

## 2023-10-18 DIAGNOSIS — Z87.59 HISTORY OF MISCARRIAGE: Primary | ICD-10-CM

## 2023-10-18 NOTE — RESULT ENCOUNTER NOTE
Normal prenatal labs (done for preconception counseling); Neg APA testing - will need repeat in 12 weeks. TSH wnl. Dwayne Abdelrahman.  Aggie Joshi MD  OB/GYN  10/18/2023  10:55 AM

## 2023-10-23 NOTE — PROGRESS NOTES
Assessment/Plan   Problem List Items Addressed This Visit    None  Visit Diagnoses       Well woman exam    -  Primary    Relevant Orders    IGP, rfx Aptima HPV ASCU            Discussion  I have discussed the importance of monthly self-breast exams, exercise and healthy diet. Encourage safe sexual practices; STI testing - declines  Contraception - TTC    The current ASCCP guidelines were reviewed. Patient requires pap today. All questions have been answered to her satisfaction  RTO for APE or sooner if needed    Subjective     HPI   Alyx Cowan is a 32 y.o. female who presents for annual well woman exam.     Menarche 13 y/o ;LMP - 10/11/23; She dc'd [facility] in February; miscarriage in April and again, in September. Unable to tell if periods are regular due to miscarriages. Minimal cramping and normal flow bleeding; lasting between 5-7 days. No vulvar itch/burn; No vaginal itch/burn; No abn discharge or odor; No urinary sx - burning/pain/frequency/hematuria    (+) SBEs - no breast masses, asymmetry, nipple discharge or bleeding, changes in skin of breast, or breast tenderness bilaterally    No abd/pelvic pain or HAs;     Pt is sexually active in a mutually monog sexual relationship; No issues with intercourse; She declines sti/hiv/hep testing; Feels safe at home    Current contraception: TTC      (+) PCP for routine Bw/care;        Review of Systems   Constitutional:  Negative for fatigue. Eyes:  Negative for photophobia and visual disturbance. Respiratory:  Negative for cough and shortness of breath. Cardiovascular:  Negative for chest pain and palpitations. Gastrointestinal:  Negative for abdominal pain, blood in stool, constipation, diarrhea, nausea and rectal pain. Genitourinary:  Negative for dyspareunia, dysuria, flank pain, frequency, genital sores, menstrual problem, pelvic pain, urgency, vaginal bleeding, vaginal discharge and vaginal pain.    Musculoskeletal:  Negative for arthralgias and back pain. Skin:  Negative for rash. Neurological:  Negative for weakness and headaches. The following portions of the patient's history were reviewed and updated as appropriate: allergies, current medications, past family history, past medical history, past social history, and past surgical history. OB History          2    Para        Term                AB   2    Living             SAB   2    IAB        Ectopic        Multiple        Live Births                     Past Medical History:   Diagnosis Date    Miscarriage 23, 23    Spontaneous       History reviewed. No pertinent surgical history. Family History   Problem Relation Age of Onset    Asthma Father     Cancer Paternal Grandfather         non hodgkin's lymphoma       Social History     Socioeconomic History    Marital status: /Civil Union     Spouse name: Not on file    Number of children: Not on file    Years of education: Not on file    Highest education level: Not on file   Occupational History    Not on file   Tobacco Use    Smoking status: Never    Smokeless tobacco: Never   Vaping Use    Vaping Use: Never used   Substance and Sexual Activity    Alcohol use: Not Currently    Drug use: Never    Sexual activity: Yes     Partners: Male   Other Topics Concern    Not on file   Social History Narrative    Not on file     Social Determinants of Health     Financial Resource Strain: Low Risk  (2023)    Overall Financial Resource Strain (CARDIA)     Difficulty of Paying Living Expenses: Not hard at all   Food Insecurity: No Food Insecurity (2023)    Hunger Vital Sign     Worried About Running Out of Food in the Last Year: Never true     [address] in the Last Year: Never true   Transportation Needs: No Transportation Needs (2023)    PRAPARE - Transportation     Lack of Transportation (Medical): No     Lack of Transportation (Non-Medical):  No   Physical Activity: Not on file   Stress: Not on file   Social Connections: Not on file   Intimate Partner Violence: Not on file   Housing Stability: Not on file         Current Outpatient Medications:     Prenatal Multivit-Min-Fe-FA (PRE- PO), Take by mouth, Disp: , Rfl:     No Known Allergies    Objective   Vitals:    10/24/23 1410   BP: 126/62   BP Location: Left arm   Patient Position: Sitting   Cuff Size: Standard   Weight: 51 kg (112 lb 6.4 oz)   Height: 5' 2" (1.575 m)     Physical Exam  Vitals and nursing note reviewed. Constitutional:       Appearance: Normal appearance. She is well-developed and normal weight. HENT:      Head: Normocephalic and atraumatic. Eyes:      Conjunctiva/sclera: Conjunctivae normal.   Cardiovascular:      Rate and Rhythm: Normal rate and regular rhythm. Heart sounds: Normal heart sounds. Pulmonary:      Effort: Pulmonary effort is normal.      Breath sounds: Normal breath sounds. Chest:   Breasts:     Breasts are symmetrical.      Right: Normal. No inverted nipple, mass, nipple discharge, skin change or tenderness. Left: Normal. No inverted nipple, mass, nipple discharge, skin change or tenderness. Abdominal:      General: Abdomen is flat. There is no distension. Palpations: Abdomen is soft. There is no mass. Tenderness: There is no abdominal tenderness. There is no right CVA tenderness or left CVA tenderness. Genitourinary:     General: Normal vulva. Exam position: Lithotomy position. Pubic Area: No rash or pubic lice. Labia:         Right: No rash or tenderness. Left: No rash or tenderness. Urethra: No urethral pain. Vagina: Normal. No vaginal discharge. Cervix: Normal.      Uterus: Normal. No uterine prolapse. Adnexa: Right adnexa normal and left adnexa normal.        Right: No mass or tenderness. Left: No mass or tenderness. Musculoskeletal:         General: No tenderness. Normal range of motion.       Cervical back: Normal range of motion. No tenderness. Right lower leg: No edema. Left lower leg: No edema. Lymphadenopathy:      Cervical: No cervical adenopathy. Upper Body:      Right upper body: No supraclavicular or axillary adenopathy. Left upper body: No supraclavicular or axillary adenopathy. Lower Body: No right inguinal adenopathy. No left inguinal adenopathy. Skin:     General: Skin is warm and dry. Neurological:      Mental Status: She is alert and oriented to person, place, and time. Motor: No weakness. Psychiatric:         Mood and Affect: Mood normal.         Behavior: Behavior normal.         Thought Content: Thought content normal.         Judgment: Judgment normal.         There are no Patient Instructions on file for this visit.

## 2023-10-24 ENCOUNTER — OFFICE VISIT (OUTPATIENT)
Dept: OBGYN CLINIC | Facility: CLINIC | Age: 28
End: 2023-10-24

## 2023-10-24 VITALS
WEIGHT: 112.4 LBS | BODY MASS INDEX: 20.68 KG/M2 | SYSTOLIC BLOOD PRESSURE: 126 MMHG | HEIGHT: 62 IN | DIASTOLIC BLOOD PRESSURE: 62 MMHG

## 2023-10-24 DIAGNOSIS — Z01.419 WELL WOMAN EXAM: Primary | ICD-10-CM

## 2023-11-01 LAB
CYTOLOGIST CVX/VAG CYTO: NORMAL
DX ICD CODE: NORMAL
Lab: NORMAL
OTHER STN SPEC: NORMAL
OTHER STN SPEC: NORMAL
PATH REPORT.FINAL DX SPEC: NORMAL
SL AMB NOTE:: NORMAL
SL AMB SPECIMEN ADEQUACY: NORMAL
SL AMB TEST METHODOLOGY: NORMAL

## 2023-12-15 ENCOUNTER — HOSPITAL ENCOUNTER (EMERGENCY)
Facility: HOSPITAL | Age: 28
Discharge: HOME | End: 2023-12-15
Attending: EMERGENCY MEDICINE
Payer: COMMERCIAL

## 2023-12-15 ENCOUNTER — APPOINTMENT (EMERGENCY)
Dept: RADIOLOGY | Facility: HOSPITAL | Age: 28
End: 2023-12-15
Payer: COMMERCIAL

## 2023-12-15 VITALS
BODY MASS INDEX: 20.73 KG/M2 | OXYGEN SATURATION: 100 % | WEIGHT: 113.32 LBS | SYSTOLIC BLOOD PRESSURE: 132 MMHG | DIASTOLIC BLOOD PRESSURE: 76 MMHG | RESPIRATION RATE: 18 BRPM | HEART RATE: 82 BPM | TEMPERATURE: 98.3 F

## 2023-12-15 DIAGNOSIS — R07.9 CHEST PAIN, UNSPECIFIED: Primary | ICD-10-CM

## 2023-12-15 LAB
ANION GAP SERPL CALCULATED.3IONS-SCNC: 8 MMOL/L
BASOPHILS # BLD AUTO: 0.05 THOUSANDS/ÂΜL (ref 0–0.1)
BASOPHILS NFR BLD AUTO: 1 % (ref 0–1)
BUN SERPL-MCNC: 21 MG/DL (ref 5–25)
CALCIUM SERPL-MCNC: 9.5 MG/DL (ref 8.4–10.2)
CARDIAC TROPONIN I PNL SERPL HS: <2 NG/L
CHLORIDE SERPL-SCNC: 104 MMOL/L (ref 96–108)
CO2 SERPL-SCNC: 27 MMOL/L (ref 21–32)
CREAT SERPL-MCNC: 0.74 MG/DL (ref 0.6–1.3)
EOSINOPHIL # BLD AUTO: 0.09 THOUSAND/ÂΜL (ref 0–0.61)
EOSINOPHIL NFR BLD AUTO: 2 % (ref 0–6)
ERYTHROCYTE [DISTWIDTH] IN BLOOD BY AUTOMATED COUNT: 11.9 % (ref 11.6–15.1)
GFR SERPL CREATININE-BSD FRML MDRD: 109 ML/MIN/1.73SQ M
GLUCOSE SERPL-MCNC: 84 MG/DL (ref 65–140)
HCT VFR BLD AUTO: 42 % (ref 34.8–46.1)
HGB BLD-MCNC: 14.3 G/DL (ref 11.5–15.4)
IMM GRANULOCYTES # BLD AUTO: 0 THOUSAND/UL (ref 0–0.2)
IMM GRANULOCYTES NFR BLD AUTO: 0 % (ref 0–2)
LYMPHOCYTES # BLD AUTO: 2.42 THOUSANDS/ÂΜL (ref 0.6–4.47)
LYMPHOCYTES NFR BLD AUTO: 50 % (ref 14–44)
MCH RBC QN AUTO: 30.4 PG (ref 26.8–34.3)
MCHC RBC AUTO-ENTMCNC: 34 G/DL (ref 31.4–37.4)
MCV RBC AUTO: 89 FL (ref 82–98)
MONOCYTES # BLD AUTO: 0.41 THOUSAND/ÂΜL (ref 0.17–1.22)
MONOCYTES NFR BLD AUTO: 8 % (ref 4–12)
NEUTROPHILS # BLD AUTO: 1.93 THOUSANDS/ÂΜL (ref 1.85–7.62)
NEUTS SEG NFR BLD AUTO: 39 % (ref 43–75)
NRBC BLD AUTO-RTO: 0 /100 WBCS
PLATELET # BLD AUTO: 244 THOUSANDS/UL (ref 149–390)
PMV BLD AUTO: 10.1 FL (ref 8.9–12.7)
POTASSIUM SERPL-SCNC: 3.8 MMOL/L (ref 3.5–5.3)
RBC # BLD AUTO: 4.7 MILLION/UL (ref 3.81–5.12)
SODIUM SERPL-SCNC: 139 MMOL/L (ref 135–147)
WBC # BLD AUTO: 4.9 THOUSAND/UL (ref 4.31–10.16)

## 2023-12-15 PROCEDURE — 80048 BASIC METABOLIC PNL TOTAL CA: CPT | Performed by: EMERGENCY MEDICINE

## 2023-12-15 PROCEDURE — 99283 EMERGENCY DEPT VISIT LOW MDM: CPT

## 2023-12-15 PROCEDURE — 36415 COLL VENOUS BLD VENIPUNCTURE: CPT | Performed by: EMERGENCY MEDICINE

## 2023-12-15 PROCEDURE — 99285 EMERGENCY DEPT VISIT HI MDM: CPT | Performed by: EMERGENCY MEDICINE

## 2023-12-15 PROCEDURE — 85025 COMPLETE CBC W/AUTO DIFF WBC: CPT | Performed by: EMERGENCY MEDICINE

## 2023-12-15 PROCEDURE — 71046 X-RAY EXAM CHEST 2 VIEWS: CPT

## 2023-12-15 PROCEDURE — 93005 ELECTROCARDIOGRAM TRACING: CPT

## 2023-12-15 PROCEDURE — 84484 ASSAY OF TROPONIN QUANT: CPT | Performed by: EMERGENCY MEDICINE

## 2023-12-15 NOTE — Clinical Note
Katherin Abbottiliana was seen and treated in our emergency department on 12/15/2023. Diagnosis:     Livia Nix  may return to work on return date. She may return on this date: 12/18/2023         If you have any questions or concerns, please don't hesitate to call.       Craig Reynaga MD    ______________________________           _______________          _______________  Hospital Representative                              Date                                Time

## 2023-12-16 LAB
ATRIAL RATE: 71 BPM
P AXIS: 48 DEGREES
PR INTERVAL: 142 MS
QRS AXIS: 88 DEGREES
QRSD INTERVAL: 74 MS
QT INTERVAL: 384 MS
QTC INTERVAL: 417 MS
T WAVE AXIS: 58 DEGREES
VENTRICULAR RATE: 71 BPM

## 2023-12-16 NOTE — ED PROVIDER NOTES
History  Chief Complaint   Patient presents with    Arm Pain     Intermittent left armpit pain radiating into left side of neck since this afternoon. Denies injury      Ganga Rivera is a very pleasant 68-year-old female here for evaluation of left-sided chest pain. She describes the pain located primarily in her left axilla that intermittently radiates towards the left side of her neck. Pain has been intermittent and is "sharp" in nature. No clear exacerbating or remitting factors. Patient does workout regularly and is unsure if she could have "tweaked something" while at the gym. 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 Denies any history of similar symptoms. Otherwise healthy, takes no medications. No associated fevers or URI symptoms. No associated shortness of breath, nausea, or diaphoresis. No family history of sudden cardiac death or precocious coronary artery disease. Arm Pain  Associated symptoms: chest pain    Associated symptoms: no abdominal pain, no cough, no fever, no nausea, no rash, no shortness of breath, no sore throat and no vomiting        Prior to Admission Medications   Prescriptions Last Dose Informant Patient Reported? Taking? Prenatal Multivit-Min-Fe-FA (PRE-GRAYSON PO)   Yes No   Sig: Take by mouth      Facility-Administered Medications: None       Past Medical History:   Diagnosis Date    Miscarriage 23, 23    Spontaneous       History reviewed. No pertinent surgical history. Family History   Problem Relation Age of Onset    Asthma Father     Cancer Paternal Grandfather         non hodgkin's lymphoma     I have reviewed and agree with the history as documented.     E-Cigarette/Vaping    E-Cigarette Use Never User      E-Cigarette/Vaping Substances     Social History     Tobacco Use    Smoking status: Never    Smokeless tobacco: Never   Vaping Use    Vaping status: Never Used   Substance Use Topics    Alcohol use: Not Currently    Drug use: Never       Review of Systems   Constitutional:  Negative for chills and fever. HENT:  Negative for sore throat. Eyes:  Negative for pain and visual disturbance. Respiratory:  Negative for cough and shortness of breath. Cardiovascular:  Positive for chest pain. Negative for palpitations. Gastrointestinal:  Negative for abdominal pain, nausea and vomiting. Genitourinary:  Negative for dysuria and hematuria. Musculoskeletal:  Negative for arthralgias and back pain. Skin:  Negative for color change and rash. Neurological:  Negative for syncope. All other systems reviewed and are negative. Physical Exam  Physical Exam  Vitals and nursing note reviewed. Constitutional:       General: She is not in acute distress. Appearance: She is well-developed. HENT:      Head: Normocephalic and atraumatic. Eyes:      Conjunctiva/sclera: Conjunctivae normal.   Cardiovascular:      Rate and Rhythm: Normal rate and regular rhythm. Heart sounds: No murmur heard. Comments: No bruits. Pulmonary:      Effort: Pulmonary effort is normal. No respiratory distress. Breath sounds: Normal breath sounds. Abdominal:      Palpations: Abdomen is soft. Tenderness: There is no abdominal tenderness. Musculoskeletal:         General: No swelling. Cervical back: Neck supple. Skin:     General: Skin is warm and dry. Capillary Refill: Capillary refill takes less than 2 seconds. Neurological:      General: No focal deficit present. Mental Status: She is alert and oriented to person, place, and time.    Psychiatric:         Mood and Affect: Mood normal.         Vital Signs  ED Triage Vitals [12/15/23 1650]   Temperature Pulse Respirations Blood Pressure SpO2   98.3 °F (36.8 °C) 99 16 137/77 100 %      Temp Source Heart Rate Source Patient Position - Orthostatic VS BP Location FiO2 (%)   Oral Monitor Sitting Right arm --      Pain Score       --           Vitals:    12/15/23 1650 12/15/23 1845   BP: 137/77 132/76   Pulse: 99 82   Patient Position - Orthostatic VS: Sitting Lying         Visual Acuity      ED Medications  Medications - No data to display    Diagnostic Studies  Results Reviewed       Procedure Component Value Units Date/Time    HS Troponin I 4hr [[id number]]     Lab Status: No result Specimen: Blood     HS Troponin I 2hr [[id number]]     Lab Status: No result Specimen: Blood     HS Troponin 0hr (reflex protocol) [[id number]]  (Normal) Collected: 12/15/23 1756    Lab Status: Final result Specimen: Blood from Arm, Left Updated: 12/15/23 1826     hs TnI 0hr <2 ng/L     Basic metabolic panel [[id number]] Collected: 12/15/23 1756    Lab Status: Final result Specimen: Blood from Arm, Left Updated: 12/15/23 1819     Sodium 139 mmol/L      Potassium 3.8 mmol/L      Chloride 104 mmol/L      CO2 27 mmol/L      ANION GAP 8 mmol/L      BUN 21 mg/dL      Creatinine 0.74 mg/dL      Glucose 84 mg/dL      Calcium 9.5 mg/dL      eGFR 109 ml/min/1.73sq m     Narrative:      [facility] guidelines for Chronic Kidney Disease (CKD):     Stage 1 with normal or high GFR (GFR > 90 mL/min/1.73 square meters)    Stage 2 Mild CKD (GFR = 60-89 mL/min/1.73 square meters)    Stage 3A Moderate CKD (GFR = 45-59 mL/min/1.73 square meters)    Stage 3B Moderate CKD (GFR = 30-44 mL/min/1.73 square meters)    Stage 4 Severe CKD (GFR = 15-29 mL/min/1.73 square meters)    Stage 5 End Stage CKD (GFR <15 mL/min/1.73 square meters)  Note: GFR calculation is accurate only with a steady state creatinine    CBC and differential [[id number]]  (Abnormal) Collected: 12/15/23 1756    Lab Status: Final result Specimen: Blood from Arm, Left Updated: 12/15/23 1802     WBC 4.90 Thousand/uL      RBC 4.70 Million/uL      Hemoglobin 14.3 g/dL      Hematocrit 42.0 %      MCV 89 fL      MCH 30.4 pg      MCHC 34.0 g/dL      RDW 11.9 %      MPV 10.1 fL      Platelets 244 Thousands/uL      nRBC 0 /100 WBCs      Neutrophils Relative 39 %      Immat GRANS % 0 %      Lymphocytes Relative 50 %      Monocytes Relative 8 %      Eosinophils Relative 2 %      Basophils Relative 1 %      Neutrophils Absolute 1.93 Thousands/µL      Immature Grans Absolute 0.00 Thousand/uL      Lymphocytes Absolute 2.42 Thousands/µL      Monocytes Absolute 0.41 Thousand/µL      Eosinophils Absolute 0.09 Thousand/µL      Basophils Absolute 0.05 Thousands/µL                    XR chest 2 views    (Results Pending)              Procedures  Procedures         ED Course             HEART Risk Score      Flowsheet Row Most Recent Value   Heart Score Risk Calculator    History 0 Filed at: 12/15/2023 2042   ECG 0 Filed at: 12/15/2023 2042   Age 0 Filed at: 12/15/2023 2042   Risk Factors 0 Filed at: 12/15/2023 2042   Troponin 0 Filed at: 12/15/2023 2042   HEART Score 0 Filed at: 12/15/2023 2042                  PERC Rule for PE      Flowsheet Row Most Recent Value   PERC Rule for PE    Age >=50 0 Filed at: 12/15/2023 2042   HR >=100 0 Filed at: 12/15/2023 2042   O2 Sat on room air < 95% 0 Filed at: 12/15/2023 2042   History of PE or DVT 0 Filed at: 12/15/2023 2042   Recent trauma or surgery 0 Filed at: 12/15/2023 2042   Hemoptysis 0 Filed at: 12/15/2023 2042   Exogenous estrogen 0 Filed at: 12/15/2023 2042   Unilateral leg swelling 0 Filed at: 12/15/2023 2042   PERC Rule for PE Results 0 Filed at: 12/15/2023 2042                SBIRT 20yo+      Flowsheet Row Most Recent Value   Initial Alcohol Screen: US AUDIT-C     1. How often do you have a drink containing alcohol? 0 Filed at: 12/15/2023 1800   2. How many drinks containing alcohol do you have on a typical day you are drinking? 0 Filed at: 12/15/2023 1800   3a. Male UNDER 65: How often do you have five or more drinks on one occasion? 0 Filed at: 12/15/2023 1800   3b. FEMALE Any Age, or MALE 65+: How often do you have 4 or more drinks on one occassion?  0 Filed at: 12/15/2023 1800   Audit-C Score 0 Filed at: 12/15/2023 1800   MARQUES: How many times in the past year have you. .. Used an illegal drug or used a prescription medication for non-medical reasons? Never Filed at: 12/15/2023 1800                      Medical Decision Making  Otherwise healthy 78-year-old female here with fairly nonspecific intermittent left-sided chest pain starting today. EKG and troponin without evidence of cardiac ischemia, chest x-ray negative for pneumonia or pneumothorax. Laboratory studies performed to rule out significant electrolyte abnormalities or anemia were reassuring. Patient has reassuring vital signs and reassuring physical exam.  Will plan to discharge with outpatient follow-up and return precautions. Amount and/or Complexity of Data Reviewed  Labs: ordered. Decision-making details documented in ED Course. Radiology: ordered and independent interpretation performed. Details: Chest x-ray independently interpreted by me: Negative for pneumonia or pneumothorax. ECG/medicine tests: ordered and independent interpretation performed. Details: Normal sinus rhythm at rate of 71, normal axis, normal intervals, no acute ST elevation or depression to suggest acute cardiac ischemia. Disposition  Final diagnoses:   Chest pain, unspecified     Time reflects when diagnosis was documented in both MDM as applicable and the Disposition within this note       Time User Action Codes Description Comment    12/15/2023  7:12 PM Jossy Sanchez Add [R07.9] Chest pain, unspecified           ED Disposition       ED Disposition   Discharge    Condition   Stable    Date/Time   Fri Dec 15, 2023 [address] discharge to home/self care.                    Follow-up Information       Follow up With Specialties Details Why [address] [place], [address] [place] [facility], Nurse Practitioner   [address]  [address] [facility] [address] [facility] Discharge Medication List as of 12/15/2023  7:12 PM        CONTINUE these medications which have NOT CHANGED    Details   Prenatal Multivit-Min-Fe-FA (PRE- PO) Take by mouth, Historical Med             No discharge procedures on file.     PDMP Review       None            ED Provider  Electronically Signed by             Simran Fowler MD  12/15/23 2042       Simran Fowler MD  12/15/23 2043

## 2023-12-19 LAB
CELLS ANALYZED: 20
CELLS COUNTED AMN: 20
CELLS KARYOTYPED.TOTAL BLD/T: 2
CLINICAL CYTOGENETICIST SPEC: NORMAL
ISCN BAND LEVEL QL: 500
KARYOTYP BLD/T: NORMAL
KARYOTYP BLD/T: NORMAL
SPECIMEN SOURCE: NORMAL

## 2023-12-20 ENCOUNTER — TELEPHONE (OUTPATIENT)
Facility: HOSPITAL | Age: 28
End: 2023-12-20

## 2023-12-20 NOTE — TELEPHONE ENCOUNTER
I called Ellyn at [phone] and confirmed her . I discussed the chromosome analysis results that have come in for her and her  Eligio. These tests were ordered due to Ellyn's pregnancy history of two unexplained losses, to rule out structural rearrangements or other chromosomal abnormalities Chromosome analysis for both Ellyn and Eligio revealed a normal structure and quantity of chromosomes (46,XX and 47,XY, respectively). We discussed that this is reassuring information and does not indicate any underlying chromosomal issues that would increase the risk of miscarriage in future pregnancies. Ellyn reports that all of the testing she has had to look for a possible etiology for her pregnancy losses has come back normal. We agreed that this is good news, but it can be hard to not have an answer.     Ellyn reports that she and Eligio looked at their test results together and he had no further questions. I emphasized that I am always available if either member of the couple has questions in the future, whether it be about these results or other matters relating to genetics and pregnancy. Patient verbalized understanding and had no further questions at this time.    Ольга Cortes, CGC

## 2023-12-23 ENCOUNTER — OFFICE VISIT (OUTPATIENT)
Dept: URGENT CARE | Age: 28
End: 2023-12-23
Payer: COMMERCIAL

## 2023-12-23 VITALS
OXYGEN SATURATION: 99 % | SYSTOLIC BLOOD PRESSURE: 104 MMHG | DIASTOLIC BLOOD PRESSURE: 62 MMHG | RESPIRATION RATE: 18 BRPM | TEMPERATURE: 98.9 F | HEART RATE: 111 BPM

## 2023-12-23 DIAGNOSIS — R05.1 ACUTE COUGH: Primary | ICD-10-CM

## 2023-12-23 LAB
SARS-COV-2 AG UPPER RESP QL IA: NEGATIVE
VALID CONTROL: NORMAL

## 2023-12-23 PROCEDURE — 99213 OFFICE O/P EST LOW 20 MIN: CPT

## 2023-12-23 PROCEDURE — 87811 SARS-COV-2 COVID19 W/OPTIC: CPT

## 2023-12-23 NOTE — PATIENT INSTRUCTIONS
Mucinex OTC for cold symptoms.   Hydration and rest.  Recommend nasal saline spray and humidifier.   Acetaminophen and ibuprofen for pain relief and fever reduction.   PCP follow up in 3-5 days.   Go to an emergency department if difficulty breathing occurs or if symptoms worsen.

## 2023-12-23 NOTE — PROGRESS NOTES
[facility]        NAME: Ellyn Santana is a 27 y.o. female  : 1995    MRN: [id number]  DATE: 2023  TIME: 8:56 AM      Assessment and Plan     Acute cough [R05.1]  1. Acute cough  Poct Covid 19 Rapid Antigen Test        Rapid COVID-negative.  Declined influenza PCR testing.    Patient Instructions   Mucinex OTC for cold symptoms.   Hydration and rest.  Recommend nasal saline spray and humidifier.   Acetaminophen and ibuprofen for pain relief and fever reduction.   PCP follow up in 3-5 days.   Go to an emergency department if difficulty breathing occurs or if symptoms worsen.        Chief Complaint     Chief Complaint   Patient presents with    Fatigue     Thursday started not feeling well, cough, fatigue. Fatigued yesterday. Congestion. Head pressure. Denies fevers         History of Present Illness     Patient is a 27-year-old female who presents with fatigue for 2 days.  Reports dry cough with chest congestion.  States today the congestion moved to her head.  Denies fever.  Denies vomiting or diarrhea.    Fatigue  Associated symptoms include congestion, coughing and fatigue. Pertinent negatives include no vomiting.       Review of Systems     Review of Systems   Constitutional:  Positive for fatigue.   HENT:  Positive for congestion, sinus pressure and sinus pain.    Respiratory:  Positive for cough.    Gastrointestinal:  Negative for diarrhea and vomiting.   All other systems reviewed and are negative.        Current Medications       Current Outpatient Medications:     Prenatal Multivit-Min-Fe-FA (PRE-GRAYSON PO), Take by mouth, Disp: , Rfl:     Current Allergies     Allergies as of 2023    (No Known Allergies)              The following portions of the patient's history were reviewed and updated as appropriate: allergies, current medications, past family history, past medical history, past social history, past surgical history and problem list.     Past Medical History:    Diagnosis Date    Miscarriage 4/26/23, 8/7/23    Spontaneous       History reviewed. No pertinent surgical history.    Family History   Problem Relation Age of Onset    Asthma Father     Cancer Paternal Grandfather         non hodgkin's lymphoma         Medications have been verified.        Objective     /62   Pulse (!) 111   Temp 98.9 °F (37.2 °C)   Resp 18   LMP 12/08/2023 (Exact Date)   SpO2 99%   Patient's last menstrual period was 12/08/2023 (exact date).         Physical Exam     Physical Exam  Vitals and nursing note reviewed.   Constitutional:       General: She is awake. She is not in acute distress.     Appearance: Normal appearance. She is not ill-appearing, toxic-appearing or diaphoretic.   HENT:      Right Ear: Tympanic membrane, ear canal and external ear normal.      Left Ear: Tympanic membrane, ear canal and external ear normal.      Nose: Congestion present.      Mouth/Throat:      Lips: Pink.      Mouth: Mucous membranes are moist.      Pharynx: Oropharynx is clear. Uvula midline.   Cardiovascular:      Rate and Rhythm: Normal rate.      Pulses: Normal pulses.      Heart sounds: Normal heart sounds, S1 normal and S2 normal.   Pulmonary:      Effort: Pulmonary effort is normal.      Breath sounds: Normal breath sounds and air entry.   Skin:     General: Skin is warm.      Capillary Refill: Capillary refill takes less than 2 seconds.   Neurological:      Mental Status: She is alert.   Psychiatric:         Mood and Affect: Mood normal.         Behavior: Behavior normal.         Thought Content: Thought content normal.         Judgment: Judgment normal.

## 2024-02-15 ENCOUNTER — PATIENT MESSAGE (OUTPATIENT)
Dept: OBGYN CLINIC | Facility: CLINIC | Age: 29
End: 2024-02-15

## 2024-03-26 ENCOUNTER — LAB (OUTPATIENT)
Dept: LAB | Facility: HOSPITAL | Age: 29
End: 2024-03-26
Payer: COMMERCIAL

## 2024-03-26 ENCOUNTER — TELEPHONE (OUTPATIENT)
Age: 29
End: 2024-03-26

## 2024-03-26 DIAGNOSIS — Z87.59 HISTORY OF MISCARRIAGE: Primary | ICD-10-CM

## 2024-03-26 DIAGNOSIS — Z87.59 HISTORY OF MISCARRIAGE: ICD-10-CM

## 2024-03-26 LAB
B-HCG SERPL-ACNC: 129 MIU/ML (ref 0–5)
PROGEST SERPL-MCNC: 25.32 NG/ML

## 2024-03-26 PROCEDURE — 36415 COLL VENOUS BLD VENIPUNCTURE: CPT

## 2024-03-26 PROCEDURE — 84144 ASSAY OF PROGESTERONE: CPT

## 2024-03-26 PROCEDURE — 84702 CHORIONIC GONADOTROPIN TEST: CPT

## 2024-03-26 NOTE — TELEPHONE ENCOUNTER
Do you want her to start on any progesterone medication right now or wait until we get the results back first?

## 2024-03-26 NOTE — TELEPHONE ENCOUNTER
Yes please! I have placed the ordered for HCG x2 (48 hours apart) and a progesterone level. Thanks!

## 2024-03-26 NOTE — TELEPHONE ENCOUNTER
Patient aware that labs are ordered, she is going to the lab today and then again on Thursday. Will follow up with her once we have the results

## 2024-03-26 NOTE — TELEPHONE ENCOUNTER
Patient called, she has a hx of 2 miscarriages. She was told when she got pregnant again she was to call the office so we could send in progesterone for her. Her LMP was 2/26 which would make her 4w1d today. Did you want her to have an HCG level and progesterone level done at the lab as well?    She uses the [facility] on [address]

## 2024-03-28 ENCOUNTER — APPOINTMENT (OUTPATIENT)
Dept: LAB | Facility: HOSPITAL | Age: 29
End: 2024-03-28
Payer: COMMERCIAL

## 2024-03-28 DIAGNOSIS — Z87.59 HISTORY OF MISCARRIAGE: Primary | ICD-10-CM

## 2024-03-28 LAB — B-HCG SERPL-ACNC: 284 MIU/ML (ref 0–5)

## 2024-03-28 PROCEDURE — 84702 CHORIONIC GONADOTROPIN TEST: CPT

## 2024-03-28 PROCEDURE — 86147 CARDIOLIPIN ANTIBODY EA IG: CPT

## 2024-03-28 PROCEDURE — 85613 RUSSELL VIPER VENOM DILUTED: CPT

## 2024-03-28 PROCEDURE — 85732 THROMBOPLASTIN TIME PARTIAL: CPT

## 2024-03-28 PROCEDURE — 85670 THROMBIN TIME PLASMA: CPT

## 2024-03-28 PROCEDURE — 36415 COLL VENOUS BLD VENIPUNCTURE: CPT

## 2024-03-28 PROCEDURE — 85705 THROMBOPLASTIN INHIBITION: CPT

## 2024-03-28 PROCEDURE — 86146 BETA-2 GLYCOPROTEIN ANTIBODY: CPT

## 2024-03-29 LAB
B2 GLYCOPROT1 IGA SERPL IA-ACNC: 1.3
B2 GLYCOPROT1 IGG SERPL IA-ACNC: 0.9
B2 GLYCOPROT1 IGM SERPL IA-ACNC: <2.4
CARDIOLIPIN IGA SER IA-ACNC: 1.9
CARDIOLIPIN IGG SER IA-ACNC: 4.3
CARDIOLIPIN IGM SER IA-ACNC: 1.2

## 2024-03-30 LAB
APTT SCREEN TO CONFIRM RATIO: 1.11 RATIO (ref 0–1.34)
CONFIRM APTT/NORMAL: 35.4 SEC (ref 0–47.6)
LA PPP-IMP: NORMAL
SCREEN APTT: 29.6 SEC (ref 0–43.5)
SCREEN DRVVT: 30.4 SEC (ref 0–47)
THROMBIN TIME: 18 SEC (ref 0–23)

## 2024-04-01 ENCOUNTER — NURSE TRIAGE (OUTPATIENT)
Age: 29
End: 2024-04-01

## 2024-04-01 NOTE — TELEPHONE ENCOUNTER
"Patient called office complaining of cramping in pregnancy. She recently had a positive pregnancy test. LMP: 2/26/24. She also explains she has had a loss in pregnancy symptoms such as breast tenderness and nausea / dizziness. She is concerned she could be having a miscarriage as she has a history of miscarriages and this feels like what she has experienced in the past.   Advised her to monitor at home and if bleeding / spotting occurs or cramping worsens to call the office back. I also advised her to take a pregnancy test to confirm pregnancy. She offers no further questions or concerns at this time.     Reason for Disposition   MILD abdominal pain (e.g., doesn't interfere with normal activities)    Answer Assessment - Initial Assessment Questions  1. LOCATION: \"Where does it hurt?\"       Lower abdomen   2. RADIATION: \"Does the pain shoot anywhere else?\" (e.g., chest, back, shoulder)      No   3. ONSET: \"When did the pain begin?\" (e.g., minutes, hours or days ago)       Yesterday morning   4. ONSET: \"Gradual or sudden onset?\"      Gradual   5. PATTERN \"Does the pain come and go, or has it been constant since it started?\"       Comes and goes  6. SEVERITY: \"How bad is the pain?\" \"What does it keep you from doing?\"  (e.g., Scale 1-10; mild, moderate, or severe)    - MILD (1-3): doesn't interfere with normal activities, abdomen soft and not tender to touch     - MODERATE (4-7): interferes with normal activities or awakens from sleep, tender to touch     - SEVERE (8-10): excruciating pain, doubled over, unable to do any normal activities      2  7. RECURRENT SYMPTOM: \"Have you ever had this type of stomach pain before?\" If Yes, ask: \"When was the last time?\" and \"What happened that time?\"       Yes - prior to two other miscarriages   8. CAUSE: \"What do you think is causing the stomach pain?\"      Possible Miscarriage   9. RELIEVING/AGGRAVATING FACTORS: \"What makes it better or worse?\" (e.g., antacids, bowel movement, " "movement)      No   10. OTHER SYMPTOMS: \"Has there been any vaginal bleeding, fever, vomiting, diarrhea, or urine problems?\"        Loss of pregnancy symptoms ( breast tenderness, dizziness, nausea went away)   11. REESE: \"What date are you expecting to deliver?\"        12/2/24 - LMP- 2/26/24    Protocols used: Pregnancy - Abdominal Pain Less Than 20 Weeks EGA-ADULT-OH    "

## 2024-04-29 NOTE — PROGRESS NOTES
"S: 28 y.o.  who presents for viability scan with LMP of 24. She is 9 weeks and 1 days by her LMP, with REESE of 24. She reports nausea. She denies cramping or vaginal bleeding. This is a planned and welcomed pregnancy. Her previous pregnancies complicated by SAB.     Past Medical History:   Diagnosis Date    Miscarriage 23, 23    Spontaneous       OB History    Para Term  AB Living   2       2     SAB IAB Ectopic Multiple Live Births   2              # Outcome Date GA Lbr [place]/2nd Weight Sex Delivery Anes PTL Lv   2 SAB            1 SAB                 O:  Vitals:    24 1453   BP: 106/68   BP Location: Left arm   Patient Position: Sitting   Cuff Size: Standard   Weight: 51.8 kg (114 lb 3.2 oz)   Height: 5' 2\" (1.575 m)       TVUS indicates viable, araiza IUP measuring 20.9cm correlating with CRL of 8w4d. REESE will be based off LMP of 9w1d. REESE is 24. FHT: 170.     A/P:  1. Viable pregnancy on TVUS  2. MFM referral placed.  3. RTC in 2 week for nurse intake visit    Problem List Items Addressed This Visit    None  Visit Diagnoses       Amenorrhea    -  Primary    Relevant Orders    [facility] US OB < 14 weeks single or first gestation level 1 (Completed)    Early stage of pregnancy        Relevant Orders    Ambulatory Referral to Maternal Fetal Medicine            "

## 2024-04-30 ENCOUNTER — ULTRASOUND (OUTPATIENT)
Dept: OBGYN CLINIC | Facility: CLINIC | Age: 29
End: 2024-04-30

## 2024-04-30 VITALS
SYSTOLIC BLOOD PRESSURE: 106 MMHG | WEIGHT: 114.2 LBS | DIASTOLIC BLOOD PRESSURE: 68 MMHG | HEIGHT: 62 IN | BODY MASS INDEX: 21.02 KG/M2

## 2024-04-30 DIAGNOSIS — N91.2 AMENORRHEA: Primary | ICD-10-CM

## 2024-04-30 DIAGNOSIS — Z34.90 EARLY STAGE OF PREGNANCY: ICD-10-CM

## 2024-04-30 NOTE — PATIENT INSTRUCTIONS
At Checkout, make an appointment for OB Nurse Intake and Education in 2 weeks.  Please schedule future prenatal visits at checkout or by calling the [place] office at [phone]. Next appointment with a provider is in 4 weeks and will be a physical exam.   Call Maternal fetal medicine to establish care.    [email or web address]

## 2024-05-01 ENCOUNTER — TELEPHONE (OUTPATIENT)
Age: 29
End: 2024-05-01

## 2024-05-13 NOTE — PATIENT INSTRUCTIONS
.Congratulations!! Please review our Pregnancy Essential Guide and [facility] L&D Virtual tour from our networks website.     [facility] Pregnancy Essentials Guide  [facility] ([email or web address])     [facility] ([place]

## 2024-05-14 ENCOUNTER — INITIAL PRENATAL (OUTPATIENT)
Dept: OBGYN CLINIC | Facility: CLINIC | Age: 29
End: 2024-05-14

## 2024-05-14 ENCOUNTER — APPOINTMENT (OUTPATIENT)
Dept: LAB | Facility: HOSPITAL | Age: 29
End: 2024-05-14
Payer: COMMERCIAL

## 2024-05-14 VITALS — BODY MASS INDEX: 20.98 KG/M2 | WEIGHT: 114 LBS | HEIGHT: 62 IN

## 2024-05-14 DIAGNOSIS — Z31.430 ENCOUNTER OF FEMALE FOR TESTING FOR GENETIC DISEASE CARRIER STATUS FOR PROCREATIVE MANAGEMENT: ICD-10-CM

## 2024-05-14 DIAGNOSIS — Z34.01 ENCOUNTER FOR SUPERVISION OF NORMAL FIRST PREGNANCY IN FIRST TRIMESTER: ICD-10-CM

## 2024-05-14 DIAGNOSIS — Z36.9 UNSPECIFIED ANTENATAL SCREENING: ICD-10-CM

## 2024-05-14 DIAGNOSIS — Z36.9 UNSPECIFIED ANTENATAL SCREENING: Primary | ICD-10-CM

## 2024-05-14 LAB
ABO GROUP BLD: NORMAL
BASOPHILS # BLD AUTO: 0.04 THOUSANDS/ÂΜL (ref 0–0.1)
BASOPHILS NFR BLD AUTO: 1 % (ref 0–1)
BILIRUB UR QL STRIP: NEGATIVE
BLD GP AB SCN SERPL QL: NEGATIVE
CLARITY UR: CLEAR
COLOR UR: NORMAL
EOSINOPHIL # BLD AUTO: 0.1 THOUSAND/ÂΜL (ref 0–0.61)
EOSINOPHIL NFR BLD AUTO: 2 % (ref 0–6)
ERYTHROCYTE [DISTWIDTH] IN BLOOD BY AUTOMATED COUNT: 12.4 % (ref 11.6–15.1)
GLUCOSE UR STRIP-MCNC: NEGATIVE MG/DL
HCT VFR BLD AUTO: 39.2 % (ref 34.8–46.1)
HGB BLD-MCNC: 13.9 G/DL (ref 11.5–15.4)
HGB UR QL STRIP.AUTO: NEGATIVE
HIV 1+2 AB+HIV1 P24 AG SERPL QL IA: NORMAL
HIV 2 AB SERPL QL IA: NORMAL
HIV1 AB SERPL QL IA: NORMAL
HIV1 P24 AG SERPL QL IA: NORMAL
IMM GRANULOCYTES # BLD AUTO: 0.02 THOUSAND/UL (ref 0–0.2)
IMM GRANULOCYTES NFR BLD AUTO: 0 % (ref 0–2)
KETONES UR STRIP-MCNC: NEGATIVE MG/DL
LEUKOCYTE ESTERASE UR QL STRIP: NEGATIVE
LYMPHOCYTES # BLD AUTO: 1.53 THOUSANDS/ÂΜL (ref 0.6–4.47)
LYMPHOCYTES NFR BLD AUTO: 26 % (ref 14–44)
MCH RBC QN AUTO: 30.8 PG (ref 26.8–34.3)
MCHC RBC AUTO-ENTMCNC: 35.5 G/DL (ref 31.4–37.4)
MCV RBC AUTO: 87 FL (ref 82–98)
MONOCYTES # BLD AUTO: 0.57 THOUSAND/ÂΜL (ref 0.17–1.22)
MONOCYTES NFR BLD AUTO: 10 % (ref 4–12)
NEUTROPHILS # BLD AUTO: 3.68 THOUSANDS/ÂΜL (ref 1.85–7.62)
NEUTS SEG NFR BLD AUTO: 61 % (ref 43–75)
NITRITE UR QL STRIP: NEGATIVE
NRBC BLD AUTO-RTO: 0 /100 WBCS
PH UR STRIP.AUTO: 7 [PH]
PLATELET # BLD AUTO: 239 THOUSANDS/UL (ref 149–390)
PMV BLD AUTO: 10.9 FL (ref 8.9–12.7)
PROT UR STRIP-MCNC: NEGATIVE MG/DL
RBC # BLD AUTO: 4.51 MILLION/UL (ref 3.81–5.12)
RH BLD: POSITIVE
RUBV IGG SERPL IA-ACNC: 59.2 IU/ML
SP GR UR STRIP.AUTO: 1.01 (ref 1–1.04)
SPECIMEN EXPIRATION DATE: NORMAL
TREPONEMA PALLIDUM IGG+IGM AB [PRESENCE] IN SERUM OR PLASMA BY IMMUNOASSAY: NORMAL
UROBILINOGEN UA: NEGATIVE MG/DL
WBC # BLD AUTO: 5.94 THOUSAND/UL (ref 4.31–10.16)

## 2024-05-14 PROCEDURE — 86803 HEPATITIS C AB TEST: CPT

## 2024-05-14 PROCEDURE — 85025 COMPLETE CBC W/AUTO DIFF WBC: CPT

## 2024-05-14 PROCEDURE — 86706 HEP B SURFACE ANTIBODY: CPT

## 2024-05-14 PROCEDURE — 86850 RBC ANTIBODY SCREEN: CPT

## 2024-05-14 PROCEDURE — 86901 BLOOD TYPING SEROLOGIC RH(D): CPT

## 2024-05-14 PROCEDURE — 87389 HIV-1 AG W/HIV-1&-2 AB AG IA: CPT

## 2024-05-14 PROCEDURE — 86780 TREPONEMA PALLIDUM: CPT

## 2024-05-14 PROCEDURE — OBC

## 2024-05-14 PROCEDURE — 87086 URINE CULTURE/COLONY COUNT: CPT

## 2024-05-14 PROCEDURE — 86900 BLOOD TYPING SEROLOGIC ABO: CPT

## 2024-05-14 PROCEDURE — 86762 RUBELLA ANTIBODY: CPT

## 2024-05-14 PROCEDURE — 36415 COLL VENOUS BLD VENIPUNCTURE: CPT

## 2024-05-14 PROCEDURE — 87340 HEPATITIS B SURFACE AG IA: CPT

## 2024-05-14 NOTE — PROGRESS NOTES
.  OB INTAKE INTERVIEW  Patient is 28 y.o.y.o. who presents for OB intake at 11w1d wks  She is accompanied by herself during this encounter  The father of her baby (Eligio) is involved in the pregnancy and is 30 years old.      Last Menstrual Period: 2024  Ultrasound: Measured 9 weeks 1 days on 2024  Estimated Date of Delivery: 2024  confirmed by 9.1  week US    Signs/Symptoms of Pregnancy  Current pregnancy symptoms: nausea, fatigue,dizziness, metal taste in mouth  Constipation YES  Headaches no  Cramping/spotting no  PICA cravings no    Diabetes-  Body mass index is 20.85 kg/m².  If patient has 1 or more, please order early 1 hour GTT  History of GDM no  BMI >35 no  History of PCOS or current metformin use no  History of LGA/macrosomic infant (4000g/9lbs) no    If patient has 2 or more, please order early 1 hour GTT  BMI>30 no  AMA no  First degree relative with type 2 diabetes no  History of chronic HTN, hyperlipidemia, elevated A1C no  High risk race (, , ,  or ) no    Hypertension- if you answer yes to any of the following, please order baseline preeclampsia labs (cbc, comprehensive metabolic panel, urine protein creatinine ratio, uric acid)  History of of chronic HTN no  History of gestational HTN no  History of preeclampsia, eclampsia, or HELLP syndrome no  History of diabetes no  History of lupus, autoimmune disease, kidney disease no    Thyroid- if yes order TSH with reflex T4  History of thyroid disease no    Bleeding Disorder or Hx of DVT-patient or first degree relative with history of. Order the following if not done previously.   (Factor V, antithrombin III, prothrombin gene mutation, protein C and S Ag, lupus anticoagulant, anticardiolipin, beta-2 glycoprotein)   no    OB/GYN-  History of abnormal pap smear no       Date of last pap smear 10/24/2023  History of HPV no  History of Herpes/HSV no  History of other STI  (gonorrhea, chlamydia, trich) no  History of prior  no  History of prior  no  History of  delivery prior to 36 weeks 6 days no  History of blood transfusion no  Ok for blood transfusion yes    Substance screening-   History of tobacco use no  Currently using tobacco no  Substance Use Screen Level (N/A, LOW, HIGH)  low    MRSA Screening-   Does the pt have a hx of MRSA? no    Immunizations:  Influenza vaccine given this season yes  Discussed Tdap vaccine yes  Discussed COVID Vaccine yes    Genetic/[facility]-  Do you or your partner have a history of any of the following in yourselves or first degree relatives?  Cystic fibrosis no  Spinal muscular atrophy no  Hemoglobinopathy/Sickle Cell/Thalassemia no  Fragile X Intellectual Disability no        Discussed Carrier Screening being completed once in a lifetime as a standard of care lab test.  Orders placed     Appointment for Nuchal Translucency Ultrasound at [facility] scheduled for 2024      Interview education  [facility] Pregnancy Essentials Book reviewed, discussed and attached to their AVS yes    Nurse/Family Partnership- patient may qualify No ; referral placed  No    Prenatal lab work scripts No  Extra labs ordered:  No    Aspirin/Preeclampsia Screen    Risk Level Risk Factor Recommendation   LOW Prior Uncomplicated full-term delivery no No Aspirin recommendation        MODERATE Nulliparity YES Recommend low-dose aspirin if     BMI>30 no 2 or more moderate risk factors    Family History Preeclampsia (mother/sister) no     35yr old or greater no      or Low Socioeconomic no     IVF Pregnancy  no     Personal History Risks (low birth weight, prior adverse preg outcome, >10yr preg interval) no         HIGH History of Preeclampsia no Recommend low-dose aspirin if     Multifetal gestation no 1 or more high risk factors    Chronic HTN no     Type 1 or 2 Diabetes no     Renal Disease no     Autoimmune Disease  no      Contraindications to ASA  therapy:  NSAID/ ASA allergy: no  Nasal polyps: no  Asthma with history of ASA induced bronchospasm: no  Relative contraindications:  History of GI bleed: no  Active peptic ulcer disease: no  Severe hepatic dysfunction: no    Patient should be recommended to take ASA 162mg during this pregnancy from 12-36wks to lower her risk of preeclampsia: Discussed      The patient has a history now or in prior pregnancy notable for:  none      Details that I feel the provider should be aware of:  2 prior miscarriages    PN1 visit scheduled. The patient was oriented to our practice, the navigator role, reviewed delivering physicians and [facility] for Delivery. All questions were answered.    Interviewed by: Zeenat Kingston LPN, OB Nurse Navigator

## 2024-05-15 LAB
BACTERIA UR CULT: NORMAL
HBV SURFACE AB SER-ACNC: 29.1 MIU/ML
HBV SURFACE AG SER QL: NORMAL
HCV AB SER QL: NORMAL

## 2024-05-21 LAB
CITATION REF LAB TEST: NORMAL
CLINICAL INFO: NORMAL
ETHNIC BACKGROUND STATED: NORMAL
GENE DIS ANL CARRIER INTERP-IMP: NORMAL
GENE MUT TESTED BLD/T: NORMAL
LAB DIRECTOR NAME PROVIDER: NORMAL
REASON FOR REFERRAL (NARRATIVE): NORMAL
RECOMMENDATION PATIENT DOC-IMP: NORMAL
REF LAB TEST METHOD: NORMAL
SERVICE CMNT-IMP: NORMAL
SMN1 GENE MUT ANL BLD/T: NORMAL
SPECIMEN SOURCE: NORMAL

## 2024-05-28 ENCOUNTER — ROUTINE PRENATAL (OUTPATIENT)
Age: 29
End: 2024-05-28
Payer: COMMERCIAL

## 2024-05-28 VITALS
HEART RATE: 100 BPM | DIASTOLIC BLOOD PRESSURE: 74 MMHG | HEIGHT: 62 IN | SYSTOLIC BLOOD PRESSURE: 122 MMHG | BODY MASS INDEX: 21.16 KG/M2 | WEIGHT: 115 LBS

## 2024-05-28 DIAGNOSIS — Z3A.13 13 WEEKS GESTATION OF PREGNANCY: ICD-10-CM

## 2024-05-28 DIAGNOSIS — Z34.90 EARLY STAGE OF PREGNANCY: ICD-10-CM

## 2024-05-28 DIAGNOSIS — Z36.82 ENCOUNTER FOR (NT) NUCHAL TRANSLUCENCY SCAN: Primary | ICD-10-CM

## 2024-05-28 LAB
CFTR FULL MUT ANL BLD/T SEQ: NORMAL
CITATION REF LAB TEST: NORMAL
CLINICAL INFO: NORMAL
ETHNIC BACKGROUND STATED: NORMAL
GENE DIS ANL CARRIER INTERP-IMP: NORMAL
INDICATION: NORMAL
LAB DIRECTOR NAME PROVIDER: NORMAL
RECOMMENDATION PATIENT DOC-IMP: NORMAL
REF LAB TEST METHOD: NORMAL
SERVICE CMNT-IMP: NORMAL
SPECIMEN SOURCE: NORMAL

## 2024-05-28 PROCEDURE — 76813 OB US NUCHAL MEAS 1 GEST: CPT | Performed by: OBSTETRICS & GYNECOLOGY

## 2024-05-28 PROCEDURE — 99202 OFFICE O/P NEW SF 15 MIN: CPT | Performed by: OBSTETRICS & GYNECOLOGY

## 2024-05-28 NOTE — LETTER
"May 28, 2024    Angelina Parish, LIYA  [address]  [place] [address]    Patient: Ellyn Santana   YOB: 1995   Date of Visit: 2024   Gestational age 13w1d   Nature of this communication: Routine normal NT       Dear Angelina Parish,    This patient was seen recently in our  office.  The content of my evaluation today is in the ultrasound report under \"OB Procedures\" tab.     Please don't hesitate to contact our office with any concerns or questions.     Sincerely,      Carol Monaco MD  Attending Physician, Maternal-Fetal Medicine  [facility]      "

## 2024-05-28 NOTE — PROGRESS NOTES
"[facility]: Ellyn Santana was seen today for nuchal translucency ultrasound.  See ultrasound report under \"OB Procedures\" tab.   Please don't hesitate to contact our office with any concerns or questions.  -Carol Monaco MD    "

## 2024-05-29 NOTE — PROGRESS NOTES
OB/GYN  PN Visit  Ellyn Santana  [id number]  2024  3:15 PM  LIYA Araya      S: 28 y.o.  13w3d here for PN visit. Pregnancy complicated by hx of miscarriage.     OB Complaints:  Denies c/o n/v/ha, no edema, no smoking, no DV.   No vb/lof  No cramping/ctxns or signs of PTL.    She reports feeling overall good.  Established care with MFM.     O:    Pre- Vitals      Flowsheet Row Most Recent Value   Prenatal Assessment    Fetal Heart Rate 168   Prenatal Vitals    Blood Pressure 100/62   Weight - Scale 51.8 kg (114 lb 3.2 oz)   Urine Albumin/Glucose    Dilation/Effacement/Station    Vaginal Drainage    Edema               Gen: no acute distress, nonlabored breathing.  OB exam completed: fundal height, +FHT  Urine: -/-    A/P:  Problem List Items Addressed This Visit    None  Visit Diagnoses       Second trimester pregnancy    -  Primary    Relevant Orders    Chlamydia/GC DIMITRY, Confirmation          #1. 13w2d GESTATION  Physical exam and cultures done today. Last pap: . Pap not done today per ASCCP guidelines.   Patient declined genetic testing.      RTC in 4 weeks

## 2024-05-30 ENCOUNTER — INITIAL PRENATAL (OUTPATIENT)
Dept: OBGYN CLINIC | Facility: CLINIC | Age: 29
End: 2024-05-30

## 2024-05-30 VITALS
BODY MASS INDEX: 21.02 KG/M2 | WEIGHT: 114.2 LBS | HEIGHT: 62 IN | SYSTOLIC BLOOD PRESSURE: 100 MMHG | DIASTOLIC BLOOD PRESSURE: 62 MMHG

## 2024-05-30 DIAGNOSIS — Z34.92 SECOND TRIMESTER PREGNANCY: Primary | ICD-10-CM

## 2024-05-30 PROCEDURE — PNV

## 2024-06-04 LAB
C TRACH RRNA SPEC QL NAA+PROBE: NEGATIVE
N GONORRHOEA RRNA SPEC QL NAA+PROBE: NEGATIVE

## 2024-06-13 ENCOUNTER — TELEMEDICINE (OUTPATIENT)
Facility: HOSPITAL | Age: 29
End: 2024-06-13

## 2024-06-13 DIAGNOSIS — Z31.440 ENCOUNTER OF MALE FOR TESTING FOR GENETIC DISEASE CARRIER STATUS FOR PROCREATIVE MANAGEMENT: Primary | ICD-10-CM

## 2024-06-13 DIAGNOSIS — Z31.5 ENCOUNTER FOR PROCREATIVE GENETIC COUNSELING: ICD-10-CM

## 2024-06-13 PROCEDURE — NC001 PR NO CHARGE

## 2024-06-13 NOTE — PROGRESS NOTES
Genetic Counseling Note    Appointment Date:  2024  Referred By: Kiah Sykes MD  YOB: 1995  Partner:  Eligio Santana  Indication for Visit:   Carrier screening for AJ ancestry  Pregnancy History:   Estimated Date of Delivery: 2024  Estimated Gestational Age: 15w3d        Virtual Regular Visit    Verification of patient location:    Patient is located at Home in the following state in which I hold an active license: [place]      Assessment/Plan:    Problem List Items Addressed This Visit    None  Visit Diagnoses       Encounter of male for testing for genetic disease carrier status for procreative management    -  Primary    Encounter for procreative genetic counseling                     Reason for visit is   Chief Complaint   Patient presents with    Virtual Regular Visit          Encounter provider Ольга Cortes      Recent Visits  No visits were found meeting these conditions.  Showing recent visits within past 7 days and meeting all other requirements  Today's Visits  Date Type Provider Dept   24 Telemedicine Ольга Welch    Showing today's visits and meeting all other requirements  Future Appointments  No visits were found meeting these conditions.  Showing future appointments within next 150 days and meeting all other requirements       The patient was identified by name and date of birth. Ellyn Santana was informed that this is a telemedicine visit and that the visit is being conducted through the Epic Embedded platform. She agrees to proceed. My office door was closed. No one else was in the room. She acknowledged consent and understanding of privacy and security of the video platform. The patient has agreed to participate and understands they can discontinue the visit at any time.    Ellyn is a 28 y.o. female who presented with her partner Eligio for genetic counseling to discuss carrier testing options. I previously met with the couple on 10/17/2023 for a  preconception visit and to discuss their history of miscarriage. After that visit the couple chose to pursue peripheral chromosome analysis, which returned as normal for both (46,XX for Ellyn and 46,XY for Eligio). We also discussed Eligio's reported Ashkenazi Roman Catholic ancestry and different carrier screening options. Happily, Ellyn now has a continuing pregnancy and has had a first trimester ultrasound which did not raise concern for structural anomalies or aneuploidy (NT = 1.3 mm, 18%). Ellyn has completed negative carrier screening for cystic fibrosis and spinal muscular atrophy. The couple met with me today to revisit their options for other carrier screening.    We reviewed that certain genetic conditions are more common in individuals of Ashkenazi Roman Catholic descent and therefore couples are at an increased risk of offspring having one of these conditions. Many of these disorders are lethal in childhood or carry a significant risk for morbidity and mortality. These conditions are inherited in an autosomal recessive pattern, thus both parents must carry a gene mutation in order for there to be a risk for the pregnancy to be affected. We reviewed the option of expanded carrier screening. We discussed that the panels can test for carrier status for over 500 autosomal recessive and X-linked diseases. All of the diseases included on the panel are life threatening, life limiting, or have treatments available. After reviewing the available options, Eligio decided to pursue carrier screening based on his Ashkenazi Roman Catholic ethnicity. The couple plan for Ellyn to be tested only in the event Eligio is found to be a carrier for a genetic condition. The couple declined expanded carrier screening. We reviewed that diagnostic testing such as amniocentesis would be necessary if the couple wanted to know more about the fetal risk for a particular condition during pregnancy. We discussed the expected turnaround time for carrier screening  (2-3 weeks) and that testing both members of a couple sequentially rather than simultaneously may lead to delays. As Ellyn is already in her 15th week of pregnancy, the wait time for both partners' results and then an amniocentesis result could push them past the gestational age limit for pregnancy termination in [place], if that is an option they would ever consider. The couple clarified that they would likely not pursue amniocentesis even in the event that both partners are carriers for the same condition, and would not consider terminating a pregnancy; thus, they are not particularly concerned about needing to wait longer for more information. Given this, it is reasonable to follow a stepwise testing plan. A [employer] message was sent providing more information on the options from different testing laboratories. I will place test orders if/when they are desired.    Histories for the patient and her partner's family were taken during our initial session on 10/17/2023; please see consult note for further details. The couple reported no significant changes to their family history in today's visit.    Lastly, we discussed the fact that everyone in the general population regardless of age, family history, or medical background has approximately a 3-5% risk of having a child with some type of congenital anomaly, genetic disease or intellectual disability. Currently there are no tests available to rule out all birth defects or health problems.    Ellyn was provided with our contact information. In the event that we need to reach Ellyn, she prefers [employer] message, as both she and Eligio can read the messages. I encouraged her to call with any questions or concerns.    Plan/Tests Ordered:  1) Patient and partner declined expanded carrier screening.  2) Patient's partner elected carrier screening based on Ashkenazi Hoahaoism ancestry, will contact me to place order.  3) Level II anatomy ultrasound scheduled on 7/18/2024  at the [facility] site.    Time spent with Genetic Counselor: 15 minutes           HPI     Past Medical History:   Diagnosis Date    Miscarriage 23, 23    Spontaneous    Varicella     as a child       Past Surgical History:   Procedure Laterality Date    WISDOM TOOTH EXTRACTION         Current Outpatient Medications   Medication Sig Dispense Refill    Prenatal Multivit-Min-Fe-FA (PRE-GRAYSON PO) Take by mouth       No current facility-administered medications for this visit.        No Known Allergies    Review of Systems    Video Exam    There were no vitals filed for this visit.    Physical Exam     Visit Time  Total Visit Duration: 15 minutes

## 2024-06-18 ENCOUNTER — TELEPHONE (OUTPATIENT)
Dept: OBGYN CLINIC | Facility: CLINIC | Age: 29
End: 2024-06-18

## 2024-06-18 NOTE — TELEPHONE ENCOUNTER
Called the patient for her 2nd trimester call, left a voice message to return the call, also sent a message via my chart   .Ayaz Avilaia,    It's time for our 2nd trimester call! I will be reaching out next week to complete our check-in. My number may come up as spam and/or healthcare depending on your phone carrier settings. I have attached a Depression Questionnaire if you could please fill this as soon as you can prior to our phone call, I'd greatly appreciate it!     Please let me know if it is easier to have this check-in via [employer], I will gladly message you my questions if this works better for you.       Thank you,  RODERICK Epperson  OB Navigator   [facility]

## 2024-06-24 NOTE — PROGRESS NOTES
OB/GYN  PN Visit  Ellyn Santana  [id number]  2024  7:29 AM  LIYA Araya      S: 28 y.o.  17w3d here for PN visit. Pregnancy complicated by hx of miscarriage.     OB Complaints:  Denies c/o n/v/ha, no edema, no smoking, no DV.   No vb/lof  No cramping/ctxns or signs of PTL.    She reports feeling overall good.  Established care with MFM.     O:    Pre- Vitals      Flowsheet Row Most Recent Value   Prenatal Assessment    Fetal Heart Rate 150   Prenatal Vitals    Blood Pressure 118/62   Weight - Scale 53.3 kg (117 lb 6.4 oz)   Urine Albumin/Glucose    Dilation/Effacement/Station    Vaginal Drainage    Edema                 Gen: no acute distress, nonlabored breathing.  OB exam completed:  +FHT  Urine: -/-    A/P:  Problem List Items Addressed This Visit    None  Visit Diagnoses       Prenatal care in second trimester    -  Primary    Relevant Orders    Alpha fetoprotein, maternal    Ambulatory Referral to Physical Therapy          #1. 17w3d GESTATION  Labor precautions reviewed.  Labs UTD. AFP ordered today.  RH: +       RTC in 4 weeks

## 2024-06-27 ENCOUNTER — ROUTINE PRENATAL (OUTPATIENT)
Dept: OBGYN CLINIC | Facility: CLINIC | Age: 29
End: 2024-06-27

## 2024-06-27 VITALS
WEIGHT: 117.4 LBS | BODY MASS INDEX: 21.6 KG/M2 | SYSTOLIC BLOOD PRESSURE: 118 MMHG | DIASTOLIC BLOOD PRESSURE: 62 MMHG | HEIGHT: 62 IN

## 2024-06-27 DIAGNOSIS — Z34.92 PRENATAL CARE IN SECOND TRIMESTER: Primary | ICD-10-CM

## 2024-06-27 PROCEDURE — PNV

## 2024-06-28 ENCOUNTER — PATIENT MESSAGE (OUTPATIENT)
Dept: OBGYN CLINIC | Facility: CLINIC | Age: 29
End: 2024-06-28

## 2024-06-28 NOTE — PATIENT COMMUNICATION
Placed call to patient at #[phone]  in regards to [employer] message received. Patient reports pain and nausea have resolved at this time. Patient reports pain was on right side, lower abdomen, different than round ligament pain previously experienced. Reports previously would be small bursts of pain however this pain was more persistent, lasted from approx 0945 to 1115. Was also experiencing severe nausea with the pain, denies any at this time. Patient reports she just sat and rested and pain and nausea subsided without medication or attempting other methods for relief. Has been urinating regularly, has had some increased frequency of bowel movements from normal and reports just one BM this morning which was soft and normal. Has been hydrating well and denies any activity that may have contributed to pain. Patient does note that she woke up in the middle of the night last night d/t her right hip had fallen asleep. Denies any vaginal bleeding, spotting, LOF, or fever/chills. Patient verbalized understanding to continue to monitor at this time, if pain/nausea return and pain not relieved with tylenol, persistent, severe or any other questions or concerns to call office. Patient aware will review with on call provider and will call patient back if any additional recommendations received.

## 2024-07-03 ENCOUNTER — APPOINTMENT (OUTPATIENT)
Dept: LAB | Facility: HOSPITAL | Age: 29
End: 2024-07-03
Payer: COMMERCIAL

## 2024-07-03 DIAGNOSIS — Z34.92 PRENATAL CARE IN SECOND TRIMESTER: ICD-10-CM

## 2024-07-03 PROCEDURE — 82105 ALPHA-FETOPROTEIN SERUM: CPT

## 2024-07-03 PROCEDURE — 36415 COLL VENOUS BLD VENIPUNCTURE: CPT

## 2024-07-07 LAB
2ND TRIMESTER 4 SCREEN SERPL-IMP: NORMAL
AFP ADJ MOM SERPL: 0.76
AFP INTERP AMN-IMP: NORMAL
AFP INTERP SERPL-IMP: NORMAL
AFP INTERP SERPL-IMP: NORMAL
AFP SERPL-MCNC: 42.5 NG/ML
AGE AT DELIVERY: 30.9 YR
GA METHOD: NORMAL
GA: 18.3 WEEKS
IDDM PATIENT QL: NO
MULTIPLE PREGNANCY: NO
NEURAL TUBE DEFECT RISK FETUS: NORMAL %

## 2024-07-16 ENCOUNTER — EVALUATION (OUTPATIENT)
Dept: PHYSICAL THERAPY | Facility: REHABILITATION | Age: 29
End: 2024-07-16
Payer: COMMERCIAL

## 2024-07-16 DIAGNOSIS — Z34.92 PRENATAL CARE IN SECOND TRIMESTER: ICD-10-CM

## 2024-07-16 PROCEDURE — 97162 PT EVAL MOD COMPLEX 30 MIN: CPT

## 2024-07-16 PROCEDURE — 97110 THERAPEUTIC EXERCISES: CPT

## 2024-07-18 ENCOUNTER — ROUTINE PRENATAL (OUTPATIENT)
Age: 29
End: 2024-07-18
Payer: COMMERCIAL

## 2024-07-18 VITALS
SYSTOLIC BLOOD PRESSURE: 112 MMHG | HEIGHT: 62 IN | DIASTOLIC BLOOD PRESSURE: 64 MMHG | HEART RATE: 82 BPM | BODY MASS INDEX: 22.49 KG/M2 | WEIGHT: 122.2 LBS

## 2024-07-18 DIAGNOSIS — Z36.86 ENCOUNTER FOR ANTENATAL SCREENING FOR CERVICAL LENGTH: ICD-10-CM

## 2024-07-18 DIAGNOSIS — Z36.3 ENCOUNTER FOR ANTENATAL SCREENING FOR MALFORMATION USING ULTRASOUND: Primary | ICD-10-CM

## 2024-07-18 DIAGNOSIS — Z3A.20 20 WEEKS GESTATION OF PREGNANCY: ICD-10-CM

## 2024-07-18 PROCEDURE — 76805 OB US >/= 14 WKS SNGL FETUS: CPT | Performed by: OBSTETRICS & GYNECOLOGY

## 2024-07-18 PROCEDURE — 76817 TRANSVAGINAL US OBSTETRIC: CPT | Performed by: OBSTETRICS & GYNECOLOGY

## 2024-07-18 NOTE — PROGRESS NOTES
The patient was seen today for an ultrasound.  Please see ultrasound report (located under Ob Procedures) for additional details.   Thank you very much for allowing us to participate in the care of this very nice patient.  Should you have any questions, please do not hesitate to contact me.     Tim Kessler MD FACOG  Attending Physician, Maternal-Fetal Medicine  [facility]

## 2024-07-18 NOTE — LETTER
"July 18, 2024    Ellyn Santana  [address]  [place] [address]      Dear Ms. Santana:    Please note, today's ultrasound was reviewed remotely.    I reviewed the patient's genetic screening.  Maternal serum alpha-fetoprotein screening was normal at 0.76 multiples of the median.    The fetal anatomic survey is complete. There is no sonographic evidence of fetal abnormalities at this time. Good fetal movement and tone are seen. The amniotic fluid volume appears normal. A transvaginal ultrasound was performed to assess the cervix, which was not seen well transabdominally. The cervical length was 5.05 centimeters, which is normal for the current gestational age. There was no significant funneling or dynamic changes appreciated.     Given the patient's low risk overall, I recommend the patient return as clinically indicated.    Thank you very much for allowing us to participate in the care of this very nice patient. Should you have any questions, please do not hesitate to contact our office.    Portions of the record may have been created with voice recognition software. Occasional wrong word or \"sound a like\" substitutions may have occurred due to the inherent limitations of voice recognition software. Read the chart carefully and recognize, using context, where substitutions have occurred.    If you have any questions or concerns, please don't hesitate to call.    Sincerely,             Tim Kessler MD        CC: No Recipients  "

## 2024-07-18 NOTE — PROGRESS NOTES
Ultrasound Probe Disinfection    A transvaginal ultrasound was performed.   Prior to use, disinfection was performed with High Level Disinfection Process ([employer]).  Probe serial number S2: [id number] was used.      Carmelita Collins  07/18/24  7:59 AM

## 2024-07-24 ENCOUNTER — OFFICE VISIT (OUTPATIENT)
Dept: PHYSICAL THERAPY | Facility: REHABILITATION | Age: 29
End: 2024-07-24
Payer: COMMERCIAL

## 2024-07-24 DIAGNOSIS — Z34.92 PRENATAL CARE IN SECOND TRIMESTER: Primary | ICD-10-CM

## 2024-07-24 PROCEDURE — 97140 MANUAL THERAPY 1/> REGIONS: CPT

## 2024-07-24 PROCEDURE — 97110 THERAPEUTIC EXERCISES: CPT

## 2024-07-24 NOTE — PROGRESS NOTES
Daily Note     Today's date: 2024  Patient name: Ellyn Santana  : 1995  MRN: [id number]  Referring provider: Angelina Parish CRNP  Dx:   Encounter Diagnosis     ICD-10-CM    1. Prenatal care in second trimester  Z34.92           Start Time: 0900  Stop Time: 1000  Total time in clinic (min): 60 minutes      Chief Complaint: 20 Week   HPI: Pt presents to PT 20 weeks gestation w/ complaints of round ligament pain. Pt additionally wishes to improve strength and function during progression of pregnancy.   Occupation: SLP -             Subjective: Reports adherence to HEP and TA activation during exercises at gym. Reports she was able to sneeze w/o pain this morning.       Objective: See treatment diary below    Dynamometer Testing:    Right  Left  % Difference Affected   Hip Flexion 29 30 4 R   Hip Abduction 41 45 10 R   Hip Adduction 31 23 27 L   Hip Extension 19 24 24 R     Assessment: Tolerated treatment well. Dynamometer testing performed and revealed significant difference in LE adduction and hip extension. Patient tolerated therapeutic exercises well with fatigue noted in target muscle groups. Pt performed TE with proper breathing coordination with min cueing required. Pt presents with functional difficulty with R sided hip extension. Provided additional HEP for improved activation. Pt would continue to benefit from skilled physical therapy to guide progression of interventions related to deficits contributing to pain during pregnancy, limiting pt's activity tolerance and QOL. Next visit, address UR strengthening and/or reformer training.         Plan: Continue per plan of care.      Access Code: [email or web address]  URL: [email or web address]  Date: 2024  Prepared by: Pamela Ziegler    Exercises  - Quadruped Transversus Abdominis Bracing  - 1 x daily - 7 x weekly - 3 sets - 10 reps  - Supine Transversus Abdominis Bracing - Hands on Ground  - 1 x daily - 7 x weekly - 3 sets - 10  reps  Precautions:   Patient Active Problem List   Diagnosis    History of miscarriage       Diagnosis:    POC expires (Date that your POC expires) Auth Status? (BOMN, approved, pending) Unit limit (Daily) Auth Start date Expiration date PT/OT + Visit Limit?   9/24/2024  Approved          Date of Service 7/16 7/24       Visits Used 1 2       Visits Remaining         [employer] Created          Yes        Neuro Re-Ed         Urge deferral         Defecation mechanics         Breathing Mechanics         PFM Coordination         PFM Down Training         Internal Cueing                   Ther Ex         PFM Strengthening         Hip strengthening  Glue kick backs x20        Functional Strengthening  Standing hip ex w/ R band x20 each        Abdominal Strengthening TA activaiton in hook lying x20     TA quadruped x20     Goblet squats #6 x20     SL RDL #10 x20  Bear pose x20     TA Activation in quad x20     High plank 1 min w/ breath cues     Plank rock back on knees x20 w/ 5 sec hold        Dilator Training         Aerobic  TM walking 8'        Therapeutic Rest Breaks         Ergonomic training   **      UE Strengthening   **      Reformer   **      Ther Activity         Voiding Diaries         Review of sxs         Fluid Intake                  Manual Ther         PFM exam         Ortho exam Performed         Dynamometer Testing  Performed                 Modalities                           Outcome Measure          PGQ - 8

## 2024-07-24 NOTE — PROGRESS NOTES
VISIT 28 yr old  at 21w2d: Denies n/v/HA/cramping/vb/lof/edema/dv/smoking; urine neg/neg  Labs up to date; msAFP low risk; PNC - level 2 done and no follow-up ultrasounds scheduled;   PNVs + DHA - tolerating daily  Good FM     Encouraged hydration.  RTO in 4 weeks for routine ob check or sooner if needed

## 2024-07-25 ENCOUNTER — ROUTINE PRENATAL (OUTPATIENT)
Dept: OBGYN CLINIC | Facility: CLINIC | Age: 29
End: 2024-07-25

## 2024-07-25 VITALS
DIASTOLIC BLOOD PRESSURE: 62 MMHG | BODY MASS INDEX: 22.71 KG/M2 | WEIGHT: 123.4 LBS | SYSTOLIC BLOOD PRESSURE: 104 MMHG | HEIGHT: 62 IN

## 2024-07-25 DIAGNOSIS — Z34.02 ENCOUNTER FOR SUPERVISION OF NORMAL FIRST PREGNANCY IN SECOND TRIMESTER: Primary | ICD-10-CM

## 2024-07-25 PROCEDURE — PNV: Performed by: PHYSICIAN ASSISTANT

## 2024-07-31 ENCOUNTER — OFFICE VISIT (OUTPATIENT)
Dept: PHYSICAL THERAPY | Facility: REHABILITATION | Age: 29
End: 2024-07-31
Payer: COMMERCIAL

## 2024-07-31 DIAGNOSIS — Z34.92 PRENATAL CARE IN SECOND TRIMESTER: Primary | ICD-10-CM

## 2024-07-31 PROCEDURE — 97110 THERAPEUTIC EXERCISES: CPT

## 2024-07-31 NOTE — PROGRESS NOTES
Daily Note     Today's date: 2024  Patient name: Ellyn Santana  : 1995  MRN: [id number]  Referring provider: Angelina Parish CRNP  Dx:   Encounter Diagnosis     ICD-10-CM    1. Prenatal care in second trimester  Z34.92             Start Time: 945  Stop Time: 1030  Total time in clinic (min): 45 minutes      Chief Complaint: 20 Week   HPI: Pt presents to PT 20 weeks gestation w/ complaints of round ligament pain. Pt additionally wishes to improve strength and function during progression of pregnancy.   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     Objective: See treatment diary below    Dynamometer Testing:    Right  Left  % Difference Affected   Hip Flexion 29 30 4 R   Hip Abduction 41 45 10 R   Hip Adduction 31 23 27 L   Hip Extension 19 24 24 R     Assessment: Tolerated treatment well. Added reformer exercises and UE strengthening exercises as planned.  Challenged appropriately especially with the reformer exercises.  Pt would continue to benefit from skilled physical therapy to guide progression of interventions related to deficits contributing to pain during pregnancy, limiting pt's activity tolerance and QOL.  Ergonomics training next session.    Access Code: [email or web address]  URL: [email or web address]  Date: 2024  Prepared by: Genia    Exercises  - Quadruped Transversus Abdominis Bracing  - 1 x daily - 7 x weekly - 3 sets - 10 reps  - Supine Transversus Abdominis Bracing - Hands on Ground  - 1 x daily - 7 x weekly - 3 sets - 10 reps  - Quadruped Hip Extension Kicks  - 1 x daily - 7 x weekly - 3 sets - 10 reps  - Standing Hip Extension with Anchored Resistance  - 1 x daily - 7 x weekly - 3 sets - 10 reps  - Quadruped with Knee Lift  - 1 x daily - 7 x weekly - 3 sets - 10 reps  - Full Plank on Knees  - 1 x daily - 7 x weekly - 3 sets - 10 reps  - Tall Kneeling Hip Hinge  - 1 x daily - 7 x weekly - 2 sets - 10 reps - 5 hold  - Tall kneel  hip hinge with weight lift  - 1 x daily - 7 x weekly - 2 sets - 10 reps - 5 hold    Plan: Continue per plan of care.      cautions:   Patient Active Problem List   Diagnosis    History of miscarriage       Diagnosis:    POC expires (Date that your POC expires) Auth Status? (BOMN, approved, pending) Unit limit (Daily) Auth Start date Expiration date PT/OT + Visit Limit?   9/24/2024  Approved          Date of Service 7/16 7/24 7/31      Visits Used 1 2 3      Visits Remaining         [employer] Created   yes       Yes        Neuro Re-Ed         Urge deferral         Defecation mechanics         Breathing Mechanics         PFM Coordination         PFM Down Training         Internal Cueing                   Ther Ex         PFM Strengthening         Hip strengthening  Glue kick backs x20  Bridges w/ hip abduction 20x  S/l hip abduction/adduction 20x      Functional Strengthening  Standing hip ex w/ R band x20 each  Standing rotation w/ ball (multifidus) 2x10 2kg      Abdominal Strengthening TA activaiton in hook lying x20     TA quadruped x20     Goblet squats #6 x20     SL RDL #10 x20  Bear pose x20     TA Activation in quad x20     High plank 1 min w/ breath cues     Plank rock back on knees x20 w/ 5 sec hold  Tall kneel hip hinge 20x      Dilator Training         Aerobic  TM walking 8'  TM 7'      Therapeutic Rest Breaks         Ergonomic training    **     UE Strengthening   Gym exercises 7# biceps & lift, triceps kickbacks 2x10      Reformer   Tall kneel adductor w/ ball lift 1' each 2kg  Tall kneel shoulder extension 1B         Ther Activity         Voiding Diaries         Review of sxs         Fluid Intake                  Manual Ther         PFM exam         Ortho exam Performed         Dynamometer Testing  Performed                 Modalities                           Outcome Measure          PGQ - 8

## 2024-08-07 ENCOUNTER — OFFICE VISIT (OUTPATIENT)
Dept: PHYSICAL THERAPY | Facility: REHABILITATION | Age: 29
End: 2024-08-07
Payer: COMMERCIAL

## 2024-08-07 DIAGNOSIS — Z34.92 PRENATAL CARE IN SECOND TRIMESTER: Primary | ICD-10-CM

## 2024-08-07 PROCEDURE — 97110 THERAPEUTIC EXERCISES: CPT

## 2024-08-07 NOTE — PROGRESS NOTES
Daily Note     Today's date: 2024  Patient name: Ellyn Santana  : 1995  MRN: [id number]  Referring provider: Angelina Parish CRNP  Dx:   Encounter Diagnosis     ICD-10-CM    1. Prenatal care in second trimester  Z34.92                            Chief Complaint: 20 Week   HPI: Pt presents to PT 20 weeks gestation w/ complaints of round ligament pain. Pt additionally wishes to improve strength and function during progression of pregnancy.   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 Is fatigued, the baby is moving more and woke her up at 3 am.    Objective: See treatment diary below    /70 L UE in seated position prior to session    Dynamometer Testing:    Right  Left  % Difference Affected   Hip Flexion 29 30 4 R   Hip Abduction 41 45 10 R   Hip Adduction 31 23 27 L   Hip Extension 19 24 24 R     Assessment: Tolerated treatment well.  More fatigued today, did exercises in different positions to avoid overfatiguing. HA improved with exercise. Benefits from tactile cueing to improve form with rotation exercises especially. Overall good tolerance to today's session.   Pt would continue to benefit from skilled physical therapy to guide progression of interventions related to deficits contributing to pain during pregnancy, limiting pt's activity tolerance and QOL. Will be away next week, travelling to [facility] and then will be getting ready to return back to school.     Function:   In 10 weeks, pt will report improvement in tolerance to 100% of sneezing w/o pain to indicate improved pain.   In 10 weeks, pt will be able to performed 30 min exercise w/ proper breathing mechanics throughout w/ no cueing required.   In 10 weeks, pt will perform proper breathing w/ L&D mechanics in all tested positions.      Pain:   In 10 weeks, patient will report 0/10 round ligament pain  with 10/10 weighted trunk twists .          Outcome Measure:  In 10 weeks, patient will score at least 0 or less on PGQ to indicate meaningful change from 8 at IE.        Access Code: [email or web address]  URL: [email or web address]  Date: 07/31/2024  Prepared by: Genia Llanos  - Quadruped Transversus Abdominis Bracing  - 1 x daily - 7 x weekly - 3 sets - 10 reps  - Supine Transversus Abdominis Bracing - Hands on Ground  - 1 x daily - 7 x weekly - 3 sets - 10 reps  - Quadruped Hip Extension Kicks  - 1 x daily - 7 x weekly - 3 sets - 10 reps  - Standing Hip Extension with Anchored Resistance  - 1 x daily - 7 x weekly - 3 sets - 10 reps  - Quadruped with Knee Lift  - 1 x daily - 7 x weekly - 3 sets - 10 reps  - Full Plank on Knees  - 1 x daily - 7 x weekly - 3 sets - 10 reps  - Tall Kneeling Hip Hinge  - 1 x daily - 7 x weekly - 2 sets - 10 reps - 5 hold  - Tall kneel hip hinge with weight lift  - 1 x daily - 7 x weekly - 2 sets - 10 reps - 5 hold    Plan: Continue per plan of care.      cautions:   Patient Active Problem List   Diagnosis    History of miscarriage       Diagnosis:    POC expires (Date that your POC expires) Auth Status? (BOMN, approved, pending) Unit limit (Daily) Auth Start date Expiration date PT/OT + Visit Limit?   9/24/2024  Approved          Date of Service 7/16 7/24 7/31 8/6     Visits Used 1 2 3 4     Visits Remaining         [facility] Created   yes       Yes        Neuro Re-Ed         Urge deferral         Defecation mechanics         Breathing Mechanics         PFM Coordination         PFM Down Training         Internal Cueing                   Ther Ex         PFM Strengthening         Hip strengthening  Glue kick backs x20  Bridges w/ hip abduction 20x  S/l hip abduction/adduction 20x Bridges w/ hip abduction 20x  S/l hip abduction     stretching    Open book 10x each side     Functional Strengthening  Standing hip ex w/ R band x20 each  Standing rotation w/ ball (multifidus) 2x10  2kg Sitting on pball rotation RCO 2x10  Pball squats w/ 3 # each hand lift 20x     Abdominal Strengthening TA activaiton in hook lying x20     TA quadruped x20     Goblet squats #6 x20     SL RDL #10 x20  Bear pose x20     TA Activation in quad x20     High plank 1 min w/ breath cues     Plank rock back on knees x20 w/ 5 sec hold  Tall kneel hip hinge 20x Hip hinge 20x  Bird dog 15x  Donkey kicks w/ green ball 10x     Dilator Training         Aerobic  TM walking 8'  TM 7' TM 8'     Therapeutic Rest Breaks         Ergonomic training     **    UE Strengthening   Gym exercises 7# biceps & lift, triceps kickbacks 2x10      Reformer   Tall kneel adductor w/ ball lift 1' each 2kg  Tall kneel shoulder extension 1B         Ther Activity         Voiding Diaries         Review of sxs         Fluid Intake                  Manual Ther         PFM exam         Ortho exam Performed         Dynamometer Testing  Performed                 Modalities                           Outcome Measure          PGQ - 8

## 2024-08-18 NOTE — PROGRESS NOTES
OB/GYN  PN Visit  Ellyn Santana  [id number]  2024  8:14 AM  Rosibel Tellez PA-C    S: 28 y.o.  24w6d here for PN visit. Pregnancy complicated by SAB x 2.     OB complaints:  Denies c/o n/v/ha, no edema, no smoking, no DV.   No vb/lof  No cramping/ctxns or signs of PTL.    She reports that she is feeling well.    O:    Pre- Vitals    Flowsheet Row Most Recent Value   Prenatal Assessment    Fetal Heart Rate 148   Fundal Height (cm) 24 cm   Movement Present   Prenatal Vitals    Blood Pressure 112/62   Weight - Scale 58 kg (127 lb 13.9 oz)   Urine Albumin/Glucose    Dilation/Effacement/Station    Vaginal Drainage    Draining Fluid No   Edema    LLE Edema None   RLE Edema None   Facial Edema None            Gen: no acute distress, nonlabored breathing.  OB exam completed: fundal height, +FHT.  Urine: -/-    A/P:  #[id number]  Labor precautions reviewed  Fetal kick counts reviewed  Blood type: A+  Third Tri labs given.   Breast pump: order placed  Pediatrician: ref placed  Contraception: undecided      RTC in 4 weeks    Rosibel Tellez PA-C  2024  8:14 AM

## 2024-08-19 ENCOUNTER — OFFICE VISIT (OUTPATIENT)
Dept: PHYSICAL THERAPY | Facility: REHABILITATION | Age: 29
End: 2024-08-19
Payer: COMMERCIAL

## 2024-08-19 ENCOUNTER — ROUTINE PRENATAL (OUTPATIENT)
Dept: OBGYN CLINIC | Facility: CLINIC | Age: 29
End: 2024-08-19

## 2024-08-19 VITALS
DIASTOLIC BLOOD PRESSURE: 62 MMHG | WEIGHT: 127.87 LBS | BODY MASS INDEX: 23.53 KG/M2 | HEIGHT: 62 IN | SYSTOLIC BLOOD PRESSURE: 112 MMHG

## 2024-08-19 DIAGNOSIS — Z34.92 SECOND TRIMESTER PREGNANCY: Primary | ICD-10-CM

## 2024-08-19 DIAGNOSIS — Z34.92 PRENATAL CARE IN SECOND TRIMESTER: Primary | ICD-10-CM

## 2024-08-19 PROCEDURE — PNV: Performed by: PHYSICIAN ASSISTANT

## 2024-08-19 PROCEDURE — 97110 THERAPEUTIC EXERCISES: CPT

## 2024-08-19 NOTE — PROGRESS NOTES
Daily Note     Today's date: 2024  Patient name: Ellyn Santana  : 1995  MRN: [id number]  Referring provider: Angelina Parish CRNP  Dx:   Encounter Diagnosis     ICD-10-CM    1. Prenatal care in second trimester  Z34.92                 Start Time: 0900  Stop Time: 1000  Total time in clinic (min): 60 minutes      Chief Complaint: 20 Week   HPI: Pt presents to PT 20 weeks gestation w/ complaints of round ligament pain. Pt additionally wishes to improve strength and function during progression of pregnancy.   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 Wishes to return to skilled care for L&D training.     Objective: See treatment diary below      Assessment: Tolerated treatment well. Patient tolerated therapeutic exercises well with fatigue noted in target muscle groups. Pt performed TE with proper breathing coordination with no cueing required and close guard for stability exercises as precaution. Patient reports goals have been met and is equipped to transition to self management for remaining weeks until closer to delivery for L&D training. Pt verbalized understanding of HEP and proper progression of interventions. Educated on rebozo techniques for pain management.       Function:   In 10 weeks, pt will report improvement in tolerance to 100% of sneezing w/o pain to indicate improved pain. - Has met  In 10 weeks, pt will be able to performed 30 min exercise w/ proper breathing mechanics throughout w/ no cueing required. - has met   In 10 weeks, pt will perform proper breathing w/ L&D mechanics in all tested positions. - will assess NV      Pain:   In 10 weeks, patient will report 0/10 round ligament pain with 10/10 weighted trunk twists .         Outcome Measure:  In 10 weeks, patient will score at least 0 or less on PGQ to indicate meaningful change from 8 at IE.        Access Code: [email or web address]  URL:  [email or web address]  Date: 07/31/2024  Prepared by: Genia    Exercises  - Quadruped Transversus Abdominis Bracing  - 1 x daily - 7 x weekly - 3 sets - 10 reps  - Supine Transversus Abdominis Bracing - Hands on Ground  - 1 x daily - 7 x weekly - 3 sets - 10 reps  - Quadruped Hip Extension Kicks  - 1 x daily - 7 x weekly - 3 sets - 10 reps  - Standing Hip Extension with Anchored Resistance  - 1 x daily - 7 x weekly - 3 sets - 10 reps  - Quadruped with Knee Lift  - 1 x daily - 7 x weekly - 3 sets - 10 reps  - Full Plank on Knees  - 1 x daily - 7 x weekly - 3 sets - 10 reps  - Tall Kneeling Hip Hinge  - 1 x daily - 7 x weekly - 2 sets - 10 reps - 5 hold  - Tall kneel hip hinge with weight lift  - 1 x daily - 7 x weekly - 2 sets - 10 reps - 5 hold    Plan: Continue per plan of care.      cautions:   Patient Active Problem List   Diagnosis    History of miscarriage       Diagnosis:    POC expires (Date that your POC expires) Auth Status? (BOMN, approved, pending) Unit limit (Daily) Auth Start date Expiration date PT/OT + Visit Limit?   9/24/2024  Approved   7/16/24 10/14/24 12 (30 per calendar)      Date of Service 7/16 7/24 7/31 8/6 8/19    Visits Used 1 2 3 4 5    Visits Remaining         [facility] Created   yes       Yes        Neuro Re-Ed         Urge deferral         Defecation mechanics         Breathing Mechanics         PFM Coordination         PFM Down Training         Internal Cueing                   Ther Ex         PFM Strengthening         Hip strengthening  Glue kick backs x20  Bridges w/ hip abduction 20x  S/l hip abduction/adduction 20x Bridges w/ hip abduction 20x  S/l hip abduction     stretching    Open book 10x each side     Functional Strengthening  Standing hip ex w/ R band x20 each  Standing rotation w/ ball (multifidus) 2x10 2kg Sitting on pball rotation RCO 2x10  Pball squats w/ 3 # each hand lift 20x SL stance 1 min     SL stance on foam 30s x2     Step ups x20     Anterior lat  pull downs Y spring x20     Half plank on bosu [id number]     Tall plank x30 sec     Weight shifts at bar (90 90) x30     Squats on wall w/ p ball 1 min     Bicep curl #10 x20     Shoulder press #7 x20     SA row and kickback #10 x20         Abdominal Strengthening TA activaiton in hook lying x20     TA quadruped x20     Goblet squats #6 x20     SL RDL #10 x20  Bear pose x20     TA Activation in quad x20     High plank 1 min w/ breath cues     Plank rock back on knees x20 w/ 5 sec hold  Tall kneel hip hinge 20x Hip hinge 20x  Bird dog 15x  Donkey kicks w/ green ball 10x     Dilator Training         Aerobic  TM walking 8'  TM 7' TM 8' TM Walking     Therapeutic Rest Breaks              Dynamic stretching and pball mobility 10'     Robozo techniques - provided handout     Ergonomic training         UE Strengthening   Gym exercises 7# biceps & lift, triceps kickbacks 2x10      Reformer   Tall kneel adductor w/ ball lift 1' each 2kg  Tall kneel shoulder extension 1B         Ther Activity         Voiding Diaries         Review of sxs         Fluid Intake                  Manual Ther         PFM exam         Ortho exam Performed         Dynamometer Testing  Performed                 Modalities                           Outcome Measure          PGQ - 8

## 2024-08-23 LAB
DME PARACHUTE DELIVERY DATE ACTUAL: NORMAL
DME PARACHUTE DELIVERY DATE REQUESTED: NORMAL
DME PARACHUTE ITEM DESCRIPTION: NORMAL
DME PARACHUTE ORDER STATUS: NORMAL
DME PARACHUTE SUPPLIER NAME: NORMAL
DME PARACHUTE SUPPLIER PHONE: NORMAL

## 2024-08-27 ENCOUNTER — CLINICAL SUPPORT (OUTPATIENT)
Dept: POSTPARTUM | Facility: CLINIC | Age: 29
End: 2024-08-27

## 2024-08-27 ENCOUNTER — TELEPHONE (OUTPATIENT)
Dept: OBGYN CLINIC | Facility: CLINIC | Age: 29
End: 2024-08-27

## 2024-08-27 DIAGNOSIS — Z32.2 ENCOUNTER FOR CHILDBIRTH INSTRUCTION: Primary | ICD-10-CM

## 2024-08-27 NOTE — TELEPHONE ENCOUNTER
Pt returned phone call   I assisted with r/s appts with the reschedule icon   10/11  11/15  11/22  11/27  12/4

## 2024-08-30 ENCOUNTER — APPOINTMENT (OUTPATIENT)
Dept: LAB | Facility: HOSPITAL | Age: 29
End: 2024-08-30
Payer: COMMERCIAL

## 2024-08-30 DIAGNOSIS — Z34.92 SECOND TRIMESTER PREGNANCY: ICD-10-CM

## 2024-08-30 LAB
BASOPHILS # BLD AUTO: 0.05 THOUSANDS/ÂΜL (ref 0–0.1)
BASOPHILS NFR BLD AUTO: 1 % (ref 0–1)
EOSINOPHIL # BLD AUTO: 0.17 THOUSAND/ÂΜL (ref 0–0.61)
EOSINOPHIL NFR BLD AUTO: 2 % (ref 0–6)
ERYTHROCYTE [DISTWIDTH] IN BLOOD BY AUTOMATED COUNT: 13.2 % (ref 11.6–15.1)
GLUCOSE 1H P 50 G GLC PO SERPL-MCNC: 138 MG/DL (ref 70–134)
HCT VFR BLD AUTO: 33.8 % (ref 34.8–46.1)
HGB BLD-MCNC: 11.8 G/DL (ref 11.5–15.4)
IMM GRANULOCYTES # BLD AUTO: 0.14 THOUSAND/UL (ref 0–0.2)
IMM GRANULOCYTES NFR BLD AUTO: 2 % (ref 0–2)
LYMPHOCYTES # BLD AUTO: 1.69 THOUSANDS/ÂΜL (ref 0.6–4.47)
LYMPHOCYTES NFR BLD AUTO: 22 % (ref 14–44)
MCH RBC QN AUTO: 32.5 PG (ref 26.8–34.3)
MCHC RBC AUTO-ENTMCNC: 34.9 G/DL (ref 31.4–37.4)
MCV RBC AUTO: 93 FL (ref 82–98)
MONOCYTES # BLD AUTO: 0.75 THOUSAND/ÂΜL (ref 0.17–1.22)
MONOCYTES NFR BLD AUTO: 10 % (ref 4–12)
NEUTROPHILS # BLD AUTO: 4.99 THOUSANDS/ÂΜL (ref 1.85–7.62)
NEUTS SEG NFR BLD AUTO: 63 % (ref 43–75)
NRBC BLD AUTO-RTO: 0 /100 WBCS
PLATELET # BLD AUTO: 240 THOUSANDS/UL (ref 149–390)
PMV BLD AUTO: 10.5 FL (ref 8.9–12.7)
RBC # BLD AUTO: 3.63 MILLION/UL (ref 3.81–5.12)
TREPONEMA PALLIDUM IGG+IGM AB [PRESENCE] IN SERUM OR PLASMA BY IMMUNOASSAY: NORMAL
WBC # BLD AUTO: 7.79 THOUSAND/UL (ref 4.31–10.16)

## 2024-08-30 PROCEDURE — 82950 GLUCOSE TEST: CPT

## 2024-08-30 PROCEDURE — 85025 COMPLETE CBC W/AUTO DIFF WBC: CPT

## 2024-08-30 PROCEDURE — 36415 COLL VENOUS BLD VENIPUNCTURE: CPT

## 2024-08-30 PROCEDURE — 86780 TREPONEMA PALLIDUM: CPT

## 2024-09-02 DIAGNOSIS — R73.09 ABNORMAL GLUCOSE: Primary | ICD-10-CM

## 2024-09-03 ENCOUNTER — APPOINTMENT (OUTPATIENT)
Dept: LAB | Facility: HOSPITAL | Age: 29
End: 2024-09-03
Payer: COMMERCIAL

## 2024-09-03 DIAGNOSIS — R73.09 ABNORMAL GLUCOSE: ICD-10-CM

## 2024-09-03 LAB
GLUCOSE 1H P 100 G GLC PO SERPL-MCNC: 119 MG/DL (ref 65–179)
GLUCOSE 2H P 100 G GLC PO SERPL-MCNC: 128 MG/DL (ref 65–154)
GLUCOSE 3H P 100 G GLC PO SERPL-MCNC: 124 MG/DL (ref 65–139)
GLUCOSE P FAST SERPL-MCNC: 86 MG/DL (ref 65–94)

## 2024-09-03 PROCEDURE — 82951 GLUCOSE TOLERANCE TEST (GTT): CPT

## 2024-09-03 PROCEDURE — 36415 COLL VENOUS BLD VENIPUNCTURE: CPT

## 2024-09-03 PROCEDURE — 82952 GTT-ADDED SAMPLES: CPT

## 2024-09-12 ENCOUNTER — ROUTINE PRENATAL (OUTPATIENT)
Dept: OBGYN CLINIC | Facility: CLINIC | Age: 29
End: 2024-09-12
Payer: COMMERCIAL

## 2024-09-12 VITALS
WEIGHT: 133 LBS | DIASTOLIC BLOOD PRESSURE: 72 MMHG | SYSTOLIC BLOOD PRESSURE: 108 MMHG | BODY MASS INDEX: 24.48 KG/M2 | HEIGHT: 62 IN

## 2024-09-12 DIAGNOSIS — Z34.93 PRENATAL CARE IN THIRD TRIMESTER: Primary | ICD-10-CM

## 2024-09-12 DIAGNOSIS — Z23 ENCOUNTER FOR IMMUNIZATION: ICD-10-CM

## 2024-09-12 PROCEDURE — 90471 IMMUNIZATION ADMIN: CPT | Performed by: STUDENT IN AN ORGANIZED HEALTH CARE EDUCATION/TRAINING PROGRAM

## 2024-09-12 PROCEDURE — PNV: Performed by: STUDENT IN AN ORGANIZED HEALTH CARE EDUCATION/TRAINING PROGRAM

## 2024-09-12 PROCEDURE — 90715 TDAP VACCINE 7 YRS/> IM: CPT | Performed by: STUDENT IN AN ORGANIZED HEALTH CARE EDUCATION/TRAINING PROGRAM

## 2024-09-12 NOTE — PROGRESS NOTES
Ellyn is a 28-year-old -0-2-0 at 28 weeks and 3 days presenting for routine prenatal care-she denies any contractions, cramping, loss of fluid or vaginal bleeding.  She endorses good fetal movement.  Urine negative/negative.  Third trimester labs reviewed had elevated 1 hour GCT, normal 3-hour GTT.  Already has breast pump and pediatrician in mind.  Tdap vaccination reviewed today and amenable to receiving-administered in office today.  Fetal kick counts and fetal movement expectations reviewed in office today.  We discussed good hydration and keeping stools soft and medications safe for hemorrhoids in pregnancy we also reviewed starting magnesium supplement in addition to increasing potassium containing foods and good hydration to reduce leg cramps.  Return to office in 2 weeks or sooner if needed.

## 2024-09-14 ENCOUNTER — CLINICAL SUPPORT (OUTPATIENT)
Age: 29
End: 2024-09-14

## 2024-09-14 DIAGNOSIS — Z32.2 ENCOUNTER FOR CHILDBIRTH INSTRUCTION: Primary | ICD-10-CM

## 2024-09-20 ENCOUNTER — CLINICAL SUPPORT (OUTPATIENT)
Age: 29
End: 2024-09-20

## 2024-09-20 DIAGNOSIS — Z32.2 ENCOUNTER FOR CHILDBIRTH INSTRUCTION: Primary | ICD-10-CM

## 2024-09-25 ENCOUNTER — CLINICAL SUPPORT (OUTPATIENT)
Age: 29
End: 2024-09-25

## 2024-09-25 ENCOUNTER — ROUTINE PRENATAL (OUTPATIENT)
Dept: OBGYN CLINIC | Facility: CLINIC | Age: 29
End: 2024-09-25

## 2024-09-25 VITALS
HEIGHT: 62 IN | DIASTOLIC BLOOD PRESSURE: 62 MMHG | SYSTOLIC BLOOD PRESSURE: 110 MMHG | WEIGHT: 134 LBS | BODY MASS INDEX: 24.66 KG/M2

## 2024-09-25 DIAGNOSIS — Z3A.30 30 WEEKS GESTATION OF PREGNANCY: Primary | ICD-10-CM

## 2024-09-25 DIAGNOSIS — Z34.93 PRENATAL CARE, THIRD TRIMESTER: ICD-10-CM

## 2024-09-25 DIAGNOSIS — Z32.2 ENCOUNTER FOR CHILDBIRTH INSTRUCTION: Primary | ICD-10-CM

## 2024-09-25 PROCEDURE — PNV: Performed by: STUDENT IN AN ORGANIZED HEALTH CARE EDUCATION/TRAINING PROGRAM

## 2024-09-25 NOTE — ASSESSMENT & PLAN NOTE
Third tri labs notable for elevated 1 hr, normal 3hr GTT  Rh status POS  Level ii wnl  Growth not scheduled  TDAP: received  Will get RSV and flu vaccine when time  Birth Plan: would like not get epidural, ok with IV pain medication  Feeding: breast, has pump  Peds: [facility]  Contraception: condoms  30 week packet given, labor floor consent reviewed, will sign at upcoming visit  Discussed pre-term labor precautions  Return to office in 2 weeks

## 2024-09-25 NOTE — PROGRESS NOTES
Ellyn is a 28 y.o.  30w2d. Reports ++FM, no LOF, VB, or regular contractions.     Started taking magnesium, which helped with leg cramps, but started to have loose stools  Did tour of [facility]    Vitals:    24 1300   BP: 110/62   S=D  +FHTs  Assessment & Plan  30 weeks gestation of pregnancy  Third tri labs notable for elevated 1 hr, normal 3hr GTT  Rh status POS  Level ii wnl  Growth not scheduled  TDAP: received  Will get RSV and flu vaccine when time  Birth Plan: would like not get epidural, ok with IV pain medication  Feeding: breast, has pump  Peds: [facility]  Contraception: condoms  30 week packet given, labor floor consent reviewed, will sign at upcoming visit  Discussed pre-term labor precautions  Return to office in 2 weeks         Prenatal care, third trimester

## 2024-10-01 ENCOUNTER — CLINICAL SUPPORT (OUTPATIENT)
Age: 29
End: 2024-10-01

## 2024-10-01 DIAGNOSIS — Z32.2 ENCOUNTER FOR CHILDBIRTH INSTRUCTION: Primary | ICD-10-CM

## 2024-10-10 ENCOUNTER — TELEPHONE (OUTPATIENT)
Dept: OBGYN CLINIC | Facility: CLINIC | Age: 29
End: 2024-10-10

## 2024-10-10 PROBLEM — Z3A.32 32 WEEKS GESTATION OF PREGNANCY: Status: ACTIVE | Noted: 2024-07-18

## 2024-10-10 PROBLEM — Z3A.30 30 WEEKS GESTATION OF PREGNANCY: Status: RESOLVED | Noted: 2024-07-18 | Resolved: 2024-10-10

## 2024-10-10 NOTE — TELEPHONE ENCOUNTER
Patient returning RN phone call, warm transfer to [facility], unsuccessful. Please call patient back.

## 2024-10-10 NOTE — PROGRESS NOTES
OB/GYN  PN Visit  Ellyn Santana  [id number]  10/11/2024  4:05 PM  LIYA Araya    S: 28 y.o.  32w4d here for PN visit. Pregnancy complicated by history of miscarriage.     OB complaints:  Denies c/o n/v/ha, no edema, no smoking, no DV.   No vb/lof  No cramping/ctxns or signs of PTL.    She reports pelvic heaviness. She reports good fetal movement.    O:    Pre-Ying Vitals      Flowsheet Row Most Recent Value   Prenatal Assessment    Fetal Heart Rate 155   Fundal Height (cm) 30 cm   Movement Present   Prenatal Vitals    Blood Pressure 100/70   Weight - Scale 62.6 kg (138 lb)   Urine Albumin/Glucose    Dilation/Effacement/Station    Vaginal Drainage    Edema               Gen: no acute distress, nonlabored breathing.  OB exam completed: fundal height, +FHT.  Urine: -/-    A/P:  Problem List Items Addressed This Visit       History of miscarriage - Primary    32 weeks gestation of pregnancy     Third tri labs notable for elevated 1 hr, normal 3hr GTT  Rh status POS  Level ii wnl  TDAP: received  Flu given today.  RSV at next appt.   No growth scan recommended.   Position check at 36 weeks.   Birth Plan: would like not get epidural, ok with IV pain medication  Feeding: breast, has pump  Peds: [facility]  Contraception: condoms  30 week packet given, labor floor consent reviewed, consent signed today.   Discussed pre-term labor precautions  Return to office in 2 weeks         Relevant Orders    influenza vaccine preservative-free 0.5 mL IM (Fluzone, Afluria, Fluarix, Flulaval)           LIYA Araya  10/11/2024  4:05 PM

## 2024-10-10 NOTE — TELEPHONE ENCOUNTER
.Overall how are you feeling? Good,tired  overall good     Compliant with routine OB appointments? yes    Have you completed your 3rd trimester lab work? yes    Have you reviewed the contents of 3rd trimester folder from office? yes   Have you decided on a pediatrician? yes    If yes, who DR Amor     If no, what are you looking for and request sent for outreach.   Questions on paperwork to go back to office? no   Questions on the baby birth certificate forms? no        Send link for the Hospital Readiness Video via [employer]

## 2024-10-10 NOTE — ASSESSMENT & PLAN NOTE
Third tri labs notable for elevated 1 hr, normal 3hr GTT  Rh status POS  Level ii wnl  TDAP: received  Flu given today.  RSV at next appt.   No growth scan recommended.   Position check at 36 weeks.   Birth Plan: would like not get epidural, ok with IV pain medication  Feeding: breast, has pump  Peds: [facility]  Contraception: condoms  30 week packet given, labor floor consent reviewed, consent signed today.   Discussed pre-term labor precautions  Return to office in 2 weeks

## 2024-10-11 ENCOUNTER — ROUTINE PRENATAL (OUTPATIENT)
Dept: OBGYN CLINIC | Facility: CLINIC | Age: 29
End: 2024-10-11
Payer: COMMERCIAL

## 2024-10-11 VITALS
SYSTOLIC BLOOD PRESSURE: 100 MMHG | WEIGHT: 138 LBS | HEIGHT: 62 IN | BODY MASS INDEX: 25.4 KG/M2 | DIASTOLIC BLOOD PRESSURE: 70 MMHG

## 2024-10-11 DIAGNOSIS — Z3A.32 32 WEEKS GESTATION OF PREGNANCY: ICD-10-CM

## 2024-10-11 DIAGNOSIS — Z87.59 HISTORY OF MISCARRIAGE: Primary | ICD-10-CM

## 2024-10-11 PROCEDURE — 90471 IMMUNIZATION ADMIN: CPT

## 2024-10-11 PROCEDURE — PNV

## 2024-10-11 PROCEDURE — 90656 IIV3 VACC NO PRSV 0.5 ML IM: CPT

## 2024-10-11 NOTE — PROGRESS NOTES
Flu vaccine administered in left deltoid , pt tolerated well. No previous history of adverse reactions to immunizations VIS given at time of administration.

## 2024-10-24 ENCOUNTER — ROUTINE PRENATAL (OUTPATIENT)
Dept: OBGYN CLINIC | Facility: CLINIC | Age: 29
End: 2024-10-24
Payer: COMMERCIAL

## 2024-10-24 VITALS
BODY MASS INDEX: 25.58 KG/M2 | DIASTOLIC BLOOD PRESSURE: 70 MMHG | SYSTOLIC BLOOD PRESSURE: 110 MMHG | WEIGHT: 139 LBS | HEIGHT: 62 IN

## 2024-10-24 DIAGNOSIS — Z34.93 PRENATAL CARE, THIRD TRIMESTER: ICD-10-CM

## 2024-10-24 DIAGNOSIS — Z3A.34 34 WEEKS GESTATION OF PREGNANCY: Primary | ICD-10-CM

## 2024-10-24 PROCEDURE — PNV: Performed by: STUDENT IN AN ORGANIZED HEALTH CARE EDUCATION/TRAINING PROGRAM

## 2024-10-24 PROCEDURE — 90471 IMMUNIZATION ADMIN: CPT | Performed by: STUDENT IN AN ORGANIZED HEALTH CARE EDUCATION/TRAINING PROGRAM

## 2024-10-24 PROCEDURE — 90678 RSV VACC PREF BIVALENT IM: CPT | Performed by: STUDENT IN AN ORGANIZED HEALTH CARE EDUCATION/TRAINING PROGRAM

## 2024-10-24 NOTE — PROGRESS NOTES
Ellyn is a 28 y.o.  34w3d. Reports ++FM, no LOF, VB, or regular contractions.     Vitals:    10/24/24 1600   BP: 110/70     S=D  +FHTs  Assessment & Plan  34 weeks gestation of pregnancy  Third tri labs completed  Rh status POS  Growth not yet recommended, reviewed can request growth regardless, but would prefer to limit if not necessary  TDAP received  FLU received  RSV would like today  Birth Plan: Would like natural, unmedicated birth, will have    from growing place  Feeding: breast, has pump  [facility]eds[facility]  Contraception: TBD  Discussed pre-term labor precautions  Return to office in 2 weeks    Orders:    Respiratory Syncytial Virus (RSV) vaccine (recombinant) (Abrysvo)    Prenatal care, third trimester

## 2024-10-24 NOTE — ASSESSMENT & PLAN NOTE
Third tri labs completed  Rh status POS  Growth not yet recommended, reviewed can request growth regardless, but would prefer to limit if not necessary  TDAP received  FLU received  RSV would like today  Birth Plan: Would like natural, unmedicated birth, will have    from growing place  Feeding: breast, has pump  [facility]eds[facility]  Contraception: TBD  Discussed pre-term labor precautions  Return to office in 2 weeks    Orders:    Respiratory Syncytial Virus (RSV) vaccine (recombinant) (Abrysvo)

## 2024-11-04 ENCOUNTER — ROUTINE PRENATAL (OUTPATIENT)
Dept: OBGYN CLINIC | Facility: CLINIC | Age: 29
End: 2024-11-04

## 2024-11-04 VITALS
HEIGHT: 62 IN | SYSTOLIC BLOOD PRESSURE: 118 MMHG | BODY MASS INDEX: 26.13 KG/M2 | DIASTOLIC BLOOD PRESSURE: 72 MMHG | WEIGHT: 142 LBS

## 2024-11-04 DIAGNOSIS — Z3A.32 32 WEEKS GESTATION OF PREGNANCY: Primary | ICD-10-CM

## 2024-11-04 DIAGNOSIS — O26.843 UTERINE SIZE-DATE DISCREPANCY IN THIRD TRIMESTER: ICD-10-CM

## 2024-11-04 DIAGNOSIS — Z3A.36 36 WEEKS GESTATION OF PREGNANCY: ICD-10-CM

## 2024-11-04 PROBLEM — O26.849 UTERINE SIZE DATE DISCREPANCY: Status: ACTIVE | Noted: 2024-11-04

## 2024-11-04 PROCEDURE — PNV: Performed by: STUDENT IN AN ORGANIZED HEALTH CARE EDUCATION/TRAINING PROGRAM

## 2024-11-04 NOTE — PROGRESS NOTES
Assessment/Plan  Problem List Items Addressed This Visit       32 weeks gestation of pregnancy - Primary       Ellyn presents today for her 36 week [facility] visit. She is currently 36w0d  Vitals:    24 1500   BP: 118/72       A GBS culture was collected at this visit.    Labor: I have reviewed the signs and symptoms of labor with the patient, including contractions q4-5 minutes for greater than 1 hour, vaginal bleeding, leaking fluid and decreased fetal movement.  I have emphasized the continued importance of paying close attention to the baby's movements.  I have instructed the patient to call the office with any of the above symptoms.    Anesthesia: I have reviewed the options for anesthesia in labor, including IV medications in early labor, regional anesthesia with an epidural or spinal, local anesthesia or general anesthesia if needed for a . Patient is hoping to avoid epidural for pain control            Uterine size date discrepancy    Relevant Orders    Ambulatory Referral to Maternal Fetal Medicine     Other Visit Diagnoses       36 weeks gestation of pregnancy        Relevant Orders    Strep B DNA probe, amplification            Subjective    Ellyn is a 28 y.o. female,  with an Estimated Date of Delivery: 24 with a current gestational age of 36w0d. Patient reports no complaints. Fetal movement: active.     History  The following portions of the patient's history were reviewed and updated as appropriate: allergies, current medications, past family history, past medical history, past social history, past surgical history and problem list.    Objective  Vitals:    24 1500   BP: 118/72   -  FHT: 150  FH: 33  Urine: neg/neg

## 2024-11-04 NOTE — ASSESSMENT & PLAN NOTE
Ellyn presents today for her 36 week [facility] visit. She is currently 36w0d  Vitals:    24 1500   BP: 118/72       A GBS culture was collected at this visit.    Labor: I have reviewed the signs and symptoms of labor with the patient, including contractions q4-5 minutes for greater than 1 hour, vaginal bleeding, leaking fluid and decreased fetal movement.  I have emphasized the continued importance of paying close attention to the baby's movements.  I have instructed the patient to call the office with any of the above symptoms.    Anesthesia: I have reviewed the options for anesthesia in labor, including IV medications in early labor, regional anesthesia with an epidural or spinal, local anesthesia or general anesthesia if needed for a . Patient is hoping to avoid epidural for pain control

## 2024-11-06 LAB — GP B STREP DNA SPEC QL NAA+PROBE: NEGATIVE

## 2024-11-07 ENCOUNTER — ULTRASOUND (OUTPATIENT)
Dept: PERINATAL CARE | Facility: OTHER | Age: 29
End: 2024-11-07
Payer: COMMERCIAL

## 2024-11-07 VITALS
WEIGHT: 139.8 LBS | BODY MASS INDEX: 25.73 KG/M2 | SYSTOLIC BLOOD PRESSURE: 108 MMHG | DIASTOLIC BLOOD PRESSURE: 60 MMHG | HEIGHT: 62 IN | HEART RATE: 90 BPM

## 2024-11-07 DIAGNOSIS — O26.843 UTERINE SIZE-DATE DISCREPANCY IN THIRD TRIMESTER: Primary | ICD-10-CM

## 2024-11-07 DIAGNOSIS — Z36.89 ENCOUNTER FOR ULTRASOUND TO ASSESS FETAL GROWTH: ICD-10-CM

## 2024-11-07 DIAGNOSIS — Z3A.36 36 WEEKS GESTATION OF PREGNANCY: ICD-10-CM

## 2024-11-07 PROCEDURE — 76816 OB US FOLLOW-UP PER FETUS: CPT | Performed by: OBSTETRICS & GYNECOLOGY

## 2024-11-07 PROCEDURE — 99213 OFFICE O/P EST LOW 20 MIN: CPT | Performed by: OBSTETRICS & GYNECOLOGY

## 2024-11-07 NOTE — LETTER
"2024    Merle Rick MD  [address] [facility] [address]    Patient: Ellyn Santana   YOB: 1995   Date of Visit: 2024   Gestational age 36w3d   Nature of this communication: Routine though please note BREECH at 36.3 weeks. Seeing you at 37.0. Is considering ECV - please schedule if desired and still necessary.        Dear Dr. Rick,    This patient was seen recently in our  office.  The content of my evaluation today is in the ultrasound report under \"OB Procedures\" tab.     Please don't hesitate to contact our office with any concerns or questions.     Sincerely,      Carol Monaco MD  Attending Physician, Maternal-Fetal Medicine  [facility]      "

## 2024-11-07 NOTE — PROGRESS NOTES
"[facility]: Ellyn Santana was seen today for fetal growth assessment ultrasound.  See ultrasound report under \"OB Procedures\" tab.   The time spent on this established patient on the encounter date included 5 minutes previsit service time reviewing records and precharting, 10 minutes face-to-face service time counseling regarding results and coordinating care, and  5 minutes charting, totalling 20 minutes.  Please don't hesitate to contact our office with any concerns or questions.  -Carol Monaco MD    "

## 2024-11-09 ENCOUNTER — CLINICAL SUPPORT (OUTPATIENT)
Age: 29
End: 2024-11-09

## 2024-11-09 DIAGNOSIS — Z32.2 ENCOUNTER FOR CHILDBIRTH INSTRUCTION: Primary | ICD-10-CM

## 2024-11-11 ENCOUNTER — ROUTINE PRENATAL (OUTPATIENT)
Dept: OBGYN CLINIC | Facility: CLINIC | Age: 29
End: 2024-11-11

## 2024-11-11 VITALS — BODY MASS INDEX: 25.79 KG/M2 | WEIGHT: 141 LBS | DIASTOLIC BLOOD PRESSURE: 72 MMHG | SYSTOLIC BLOOD PRESSURE: 116 MMHG

## 2024-11-11 DIAGNOSIS — O26.843 UTERINE SIZE-DATE DISCREPANCY IN THIRD TRIMESTER: ICD-10-CM

## 2024-11-11 DIAGNOSIS — Z34.93 PRENATAL CARE IN THIRD TRIMESTER: Primary | ICD-10-CM

## 2024-11-11 DIAGNOSIS — Z3A.32 32 WEEKS GESTATION OF PREGNANCY: ICD-10-CM

## 2024-11-11 PROCEDURE — PNV: Performed by: OBSTETRICS & GYNECOLOGY

## 2024-11-11 NOTE — PROGRESS NOTES
Patient reports good fm, no n/v, headache, cramping, bleeding, loss of fluid, edema, dom violence, or smoking.  tea pnv urine negative protein trace glucose  -GBS negative  -Baby is breech by ultrasound today patient and  (on phone) thoroughly counseled patient had already discussed possible version procedure with Dr. Monaco and she and her  would prefer not to have version procedure  -We discussed further ultrasound checks as desired but definitively will have ultrasound prior to procedure patient would like to schedule  for closer to 40 weeks aware of risk of labor prior to delivery in which case  will be done when labor is determined discussed briefly if her cervix begins to open risk of foot and cord prolapse  -Patient would like the Wednesday before Thanksgiving message sent to staff  -Patient has a labor/postpartum  they are hoping to be able to have her presents when her  is possibly doing skin to skin I discussed that the labor  can definitely be in recovery room with them to assist with latching and nursing there may not be spaced to allow her in the OR and that that is also determined with anesthesia staff  -Labs UTD  -Had Tdap, flu, RSV  -Has breast pump and 30-week pack  -Peds: [facility]  -Contraception TBD  -Return in 1 week or sooner as needed

## 2024-11-12 ENCOUNTER — TELEPHONE (OUTPATIENT)
Dept: OBGYN CLINIC | Facility: CLINIC | Age: 29
End: 2024-11-12

## 2024-11-12 ENCOUNTER — EVALUATION (OUTPATIENT)
Dept: PHYSICAL THERAPY | Facility: REHABILITATION | Age: 29
End: 2024-11-12
Payer: COMMERCIAL

## 2024-11-12 DIAGNOSIS — Z34.93 PRENATAL CARE IN THIRD TRIMESTER: Primary | ICD-10-CM

## 2024-11-12 PROCEDURE — 97110 THERAPEUTIC EXERCISES: CPT

## 2024-11-12 NOTE — TELEPHONE ENCOUNTER
Patient returning call & preference for C/S is 11/26/2024 @ 1:00 pm - ESC message sent to Lauren Lemus ([facility]) for same.  Patient informed with instructions given.  Pink sticky note updated.  Staff message sent to Dr Rick.

## 2024-11-12 NOTE — TELEPHONE ENCOUNTER
----- Message from Merle Rick MD sent at 2024  4:38 PM EST -----  Regarding: C/s  Please schedule primary  for  the Wednesday before Thanksgiving for breech baby

## 2024-11-12 NOTE — PROGRESS NOTES
Re-Evaluation     Today's date: 2024  Patient name: Ellyn Santana  : 1995  MRN: [id number]  Referring provider: Angelina Parish CRNP  Dx:   Encounter Diagnosis     ICD-10-CM    1. Prenatal care in third trimester  Z34.93                   Start Time: 1700  Stop Time: 1800  Total time in clinic (min): 60 minutes      Chief Complaint: 20 Week   HPI: Pt presents to PT 20 weeks gestation w/ complaints of round ligament pain. Pt additionally wishes to improve strength and function during progression of pregnancy.   Occupation: SLP -             Subjective: Pt reports current US show baby is breeched and is scheduled for  section on .  However, if medically acceptable closer to due date, pt would elect for vaginal delivery if deemed medically safe by physician, therefore expressed interest in reviewing labor and delivery breathing and positioning.    Objective: See treatment diary below      Assessment: Tolerated treatment well.  Patient is returning to skilled physical therapy care at 37 weeks gestation.  Ellyn Santana has attended 6 physical therapy visits during current pregnancy for complaints of LBP.  She report symptomatic improvement in pain and function over past several weeks and took a break from skilled PT care to self manage for remaining weeks. She returns today for labor and delivery training. Patient tolerated labor and delivery training well.  Pt performed bear down with proper breathing coordination in multiple positions with min cueing required. Pt was educated on benefits of skilled PT care during post-partum recovery. Plan keep POC open until expected delivery date (~3 weeks) in case pain persists in final weeks of pregnancy and will d/c upon delivery.          Access Code: [email or web address]  URL: [email or web address]  Date: 2024  Prepared by: Genia Llanos  - Quadruped Transversus Abdominis Bracing  - 1 x daily - 7 x weekly - 3 sets - 10 reps  -  Supine Transversus Abdominis Bracing - Hands on Ground  - 1 x daily - 7 x weekly - 3 sets - 10 reps  - Quadruped Hip Extension Kicks  - 1 x daily - 7 x weekly - 3 sets - 10 reps  - Standing Hip Extension with Anchored Resistance  - 1 x daily - 7 x weekly - 3 sets - 10 reps  - Quadruped with Knee Lift  - 1 x daily - 7 x weekly - 3 sets - 10 reps  - Full Plank on Knees  - 1 x daily - 7 x weekly - 3 sets - 10 reps  - Tall Kneeling Hip Hinge  - 1 x daily - 7 x weekly - 2 sets - 10 reps - 5 hold  - Tall kneel hip hinge with weight lift  - 1 x daily - 7 x weekly - 2 sets - 10 reps - 5 hold    Plan: Discharge upon child delivery     cautions:   Patient Active Problem List   Diagnosis    History of miscarriage       Diagnosis:    POC expires (Date that your POC expires) Auth Status? (BOMN, approved, pending) Unit limit (Daily) Auth Start date Expiration date PT/OT + Visit Limit?   9/24/2024  Approved   7/16/24 10/14/24 12 (30 per calendar)      Date of Service 7/16 7/24 7/31 8/6 8/19 11/12   Visits Used 1 2 3 4 5 6   Visits Remaining         [employer] Created   yes       Yes        Neuro Re-Ed         Urge deferral         Defecation mechanics         Breathing Mechanics         PFM Coordination         PFM Down Training         Internal Cueing                   Ther Ex         PFM Strengthening         Hip strengthening  Glue kick backs x20  Bridges w/ hip abduction 20x  S/l hip abduction/adduction 20x Bridges w/ hip abduction 20x  S/l hip abduction     stretching    Open book 10x each side     Functional Strengthening  Standing hip ex w/ R band x20 each  Standing rotation w/ ball (multifidus) 2x10 2kg Sitting on pball rotation RCO 2x10  Pball squats w/ 3 # each hand lift 20x SL stance 1 min     SL stance on foam 30s x2     Step ups x20     Anterior lat pull downs Y spring x20     Half plank on bosu [id number]     Tall plank x30 sec     Weight shifts at bar (90 90) x30     Squats on wall w/ p ball 1 min     Bicep curl #10 x20      Shoulder press #7 x20     SA row and kickback #10 x20         Abdominal Strengthening TA activaiton in hook lying x20     TA quadruped x20     Goblet squats #6 x20     SL RDL #10 x20  Bear pose x20     TA Activation in quad x20     High plank 1 min w/ breath cues     Plank rock back on knees x20 w/ 5 sec hold  Tall kneel hip hinge 20x Hip hinge 20x  Bird dog 15x  Donkey kicks w/ green ball 10x     Dilator Training         Aerobic  TM walking 8'  TM 7' TM 8' TM Walking     Therapeutic Rest Breaks              Dynamic stretching and pball mobility 10'     Robozo techniques - provided handout     Ergonomic training      Supine to sit transfer training without abdominal bracing  15 minutes    Labor and delivery training    30 minutes    Rebozo techniques  10 minutes   UE Strengthening   Gym exercises 7# biceps & lift, triceps kickbacks 2x10      Reformer   Tall kneel adductor w/ ball lift 1' each 2kg  Tall kneel shoulder extension 1B         Ther Activity         Voiding Diaries         Review of sxs         Fluid Intake                  Manual Ther         PFM exam         Ortho exam Performed         Dynamometer Testing  Performed                 Modalities                           Outcome Measure          PGQ - 8                          no

## 2024-11-12 NOTE — TELEPHONE ENCOUNTER
Called [facility] (spoke with Lauren Lemus) - no availability for primary C/S on 11/27/2024 - availability 11/26/2024 @ 1:00 pm or Fri 11/28/2024 @ 8:00, 10:00 or 1:00 pm.  ESC message sent to Dr Rick to check if date preference.

## 2024-11-12 NOTE — TELEPHONE ENCOUNTER
Left non-detailed message patient's VM to recall office re: availability for primary C/S date - no availability on 11/27/2024, availability on 11/26/2024 @ 1:00 pm (Dr Rick) & 11/29/2024 @ 8:00, 10:00 or 1:00 pm (Dr Tinoco).

## 2024-11-14 NOTE — PROGRESS NOTES
OB/GYN  PN Visit  Ellyn Santana  [id number]  2024  7:32 AM  LIYA Araya    S: 28 y.o.  38w0d here for PN visit. Pregnancy complicated by breech position. LTCS scheduled  at 1300.     OB complaints:  Denies c/o n/v/ha, no edema, no smoking, no DV.   No vb/lof  No cramping/ctxns or signs of PTL.    She reports good fetal movement. +georgie alexander    O:    Pre- Vitals      Flowsheet Row Most Recent Value   Prenatal Assessment    Fetal Heart Rate 148   Fundal Height (cm) 367 cm   Movement Present   Prenatal Vitals    Blood Pressure 116/80   Weight - Scale 64.9 kg (143 lb)   Urine Albumin/Glucose    Dilation/Effacement/Station    Vaginal Drainage    Edema               Gen: no acute distress, nonlabored breathing.  OB exam completed: fundal height, +FHT.  Urine: -/-    A/P:  Problem List Items Addressed This Visit       History of miscarriage - Primary    Prenatal care, third trimester    Third tri labs notable for elevated 1 hr, normal 3hr GTT  Rh status POS  Level ii wnl  TDAP: received  Flu: recieved  RSV: 10/2024  No growth scan recommended.   Breech Position  Delivery: LTCS scheduled. She has cleanse soap and surgical pamphlet.  Feeding: breast, has pump  Peds: [facility]  Contraception: condoms  30 week packet given, labor floor consent reviewed, consent signed   Discussed pre-term labor precautions  Return to office in 1 weeks         Uterine size date discrepancy       LIYA Araya  2024  7:32 AM

## 2024-11-14 NOTE — ASSESSMENT & PLAN NOTE
Third tri labs notable for elevated 1 hr, normal 3hr GTT  Rh status POS  Level ii wnl  TDAP: received  Flu: recieved  RSV: 10/2024  No growth scan recommended.   Breech Position  Delivery: LTCS scheduled. She has cleanse soap and surgical pamphlet.  Feeding: breast, has pump  Peds: [facility]  Contraception: condoms  30 week packet given, labor floor consent reviewed, consent signed   Discussed pre-term labor precautions  Return to office in 1 weeks

## 2024-11-18 ENCOUNTER — ROUTINE PRENATAL (OUTPATIENT)
Dept: OBGYN CLINIC | Facility: CLINIC | Age: 29
End: 2024-11-18

## 2024-11-18 VITALS
DIASTOLIC BLOOD PRESSURE: 80 MMHG | BODY MASS INDEX: 26.31 KG/M2 | SYSTOLIC BLOOD PRESSURE: 116 MMHG | WEIGHT: 143 LBS | HEIGHT: 62 IN

## 2024-11-18 DIAGNOSIS — Z34.93 PRENATAL CARE, THIRD TRIMESTER: ICD-10-CM

## 2024-11-18 DIAGNOSIS — Z87.59 HISTORY OF MISCARRIAGE: Primary | ICD-10-CM

## 2024-11-18 DIAGNOSIS — O26.843 UTERINE SIZE-DATE DISCREPANCY IN THIRD TRIMESTER: ICD-10-CM

## 2024-11-18 PROCEDURE — PNV

## 2024-11-25 ENCOUNTER — TELEPHONE (OUTPATIENT)
Age: 29
End: 2024-11-25

## 2024-11-25 ENCOUNTER — ANESTHESIA EVENT (INPATIENT)
Dept: LABOR AND DELIVERY | Facility: HOSPITAL | Age: 29
End: 2024-11-25
Payer: COMMERCIAL

## 2024-11-25 NOTE — PRE-PROCEDURE INSTRUCTIONS
Left message for patient for pre-operative instructions prior to .   Pt was instructed to arrive 2 hours before her scheduled OR time (2.5 hours if Rh neg)     Pt instructed to remain NPO after midnight.              *This includes gum, water and hard candy.  *If patient takes AM meds (e.g.hypertensive meds) they may take these meds with a sip of water.    *This should not include medications like prenatal vitamins, aspirin, or colace.   *Type 1 diabetics should take their insulin as normal. Type 2/A2GDM should take a half dose of nighttime insulin.    Pt should brush their teeth as usual.     Pt was instructed to buy and use a pre-surgical wash containing chlorhexidine the night before and the morning of her scheduled  and to wear clean clothing after the wash.  Pt instructed not to shave operative area prior to surgery.     Pt was asked not to wear any jewelry and to leave all of her valuables at home.     Pt was asked to leave all of her larger bags and suitcases in the car to be brought in after she is assigned a post partum room.  Pt should bring in any paperwork she was given in the office.     Pt was informed that she may have 1 support person in the OR/PACU area and of the current visiting policies.  Support person should eat prior to the surgery.

## 2024-11-25 NOTE — TELEPHONE ENCOUNTER
Patient's  was returning your call. They had some questions about the support people in the delivery room. Please follow up with the patient

## 2024-11-26 ENCOUNTER — HOSPITAL ENCOUNTER (INPATIENT)
Facility: HOSPITAL | Age: 29
LOS: 3 days | Discharge: HOME | End: 2024-11-29
Attending: OBSTETRICS & GYNECOLOGY | Admitting: OBSTETRICS & GYNECOLOGY
Payer: COMMERCIAL

## 2024-11-26 ENCOUNTER — ANESTHESIA (INPATIENT)
Dept: LABOR AND DELIVERY | Facility: HOSPITAL | Age: 29
End: 2024-11-26
Payer: COMMERCIAL

## 2024-11-26 DIAGNOSIS — Z98.891 STATUS POST PRIMARY LOW TRANSVERSE CESAREAN SECTION: Primary | Chronic | ICD-10-CM

## 2024-11-26 PROBLEM — Z3A.39 39 WEEKS GESTATION OF PREGNANCY: Status: ACTIVE | Noted: 2024-07-18

## 2024-11-26 LAB
ABO GROUP BLD: NORMAL
BASE EXCESS BLDCOV CALC-SCNC: 0.2 MMOL/L (ref 1–9)
BLD GP AB SCN SERPL QL: NEGATIVE
ERYTHROCYTE [DISTWIDTH] IN BLOOD BY AUTOMATED COUNT: 13.2 % (ref 11.6–15.1)
HCO3 BLDCOV-SCNC: 23.5 MMOL/L (ref 12.2–28.6)
HCT VFR BLD AUTO: 41.2 % (ref 34.8–46.1)
HGB BLD-MCNC: 13.9 G/DL (ref 11.5–15.4)
HOLD SPECIMEN: NORMAL
MCH RBC QN AUTO: 31.3 PG (ref 26.8–34.3)
MCHC RBC AUTO-ENTMCNC: 33.7 G/DL (ref 31.4–37.4)
MCV RBC AUTO: 93 FL (ref 82–98)
OXYHGB MFR BLDCOV: 52.6 %
PCO2 BLDCOV: 34.6 MM HG (ref 27–43)
PH BLDCOV: 7.45 [PH] (ref 7.19–7.49)
PLATELET # BLD AUTO: 216 THOUSANDS/UL (ref 149–390)
PMV BLD AUTO: 11.5 FL (ref 8.9–12.7)
PO2 BLDCOV: 19.9 MM HG (ref 15–45)
RBC # BLD AUTO: 4.44 MILLION/UL (ref 3.81–5.12)
RH BLD: POSITIVE
SAO2 % BLDCOV: 12.5 ML/DL
SPECIMEN EXPIRATION DATE: NORMAL
TREPONEMA PALLIDUM IGG+IGM AB [PRESENCE] IN SERUM OR PLASMA BY IMMUNOASSAY: NORMAL
WBC # BLD AUTO: 8.14 THOUSAND/UL (ref 4.31–10.16)

## 2024-11-26 PROCEDURE — 85027 COMPLETE CBC AUTOMATED: CPT

## 2024-11-26 PROCEDURE — 86901 BLOOD TYPING SEROLOGIC RH(D): CPT

## 2024-11-26 PROCEDURE — 4A1HXCZ MONITORING OF PRODUCTS OF CONCEPTION, CARDIAC RATE, EXTERNAL APPROACH: ICD-10-PCS | Performed by: OBSTETRICS & GYNECOLOGY

## 2024-11-26 PROCEDURE — 86780 TREPONEMA PALLIDUM: CPT

## 2024-11-26 PROCEDURE — 86850 RBC ANTIBODY SCREEN: CPT

## 2024-11-26 PROCEDURE — 86900 BLOOD TYPING SEROLOGIC ABO: CPT

## 2024-11-26 PROCEDURE — 59510 CESAREAN DELIVERY: CPT | Performed by: OBSTETRICS & GYNECOLOGY

## 2024-11-26 PROCEDURE — 82805 BLOOD GASES W/O2 SATURATION: CPT | Performed by: OBSTETRICS & GYNECOLOGY

## 2024-11-26 PROCEDURE — NC001 PR NO CHARGE: Performed by: OBSTETRICS & GYNECOLOGY

## 2024-11-26 RX ORDER — IBUPROFEN 600 MG/1
600 TABLET, FILM COATED ORAL EVERY 6 HOURS
Status: DISCONTINUED | OUTPATIENT
Start: 2024-11-28 | End: 2024-11-29 | Stop reason: HOSPADM

## 2024-11-26 RX ORDER — ACETAMINOPHEN 325 MG/1
650 TABLET ORAL EVERY 6 HOURS SCHEDULED
Status: DISCONTINUED | OUTPATIENT
Start: 2024-11-26 | End: 2024-11-26

## 2024-11-26 RX ORDER — HYDROMORPHONE HCL/PF 1 MG/ML
0.5 SYRINGE (ML) INJECTION EVERY 2 HOUR PRN
Status: DISCONTINUED | OUTPATIENT
Start: 2024-11-26 | End: 2024-11-29 | Stop reason: HOSPADM

## 2024-11-26 RX ORDER — BUPIVACAINE HYDROCHLORIDE 7.5 MG/ML
INJECTION, SOLUTION INTRASPINAL AS NEEDED
Status: DISCONTINUED | OUTPATIENT
Start: 2024-11-26 | End: 2024-11-26

## 2024-11-26 RX ORDER — EPHEDRINE SULFATE 50 MG/ML
INJECTION INTRAVENOUS AS NEEDED
Status: DISCONTINUED | OUTPATIENT
Start: 2024-11-26 | End: 2024-11-26

## 2024-11-26 RX ORDER — OXYCODONE HYDROCHLORIDE 5 MG/1
5 TABLET ORAL EVERY 4 HOURS PRN
Status: DISCONTINUED | OUTPATIENT
Start: 2024-11-27 | End: 2024-11-29 | Stop reason: HOSPADM

## 2024-11-26 RX ORDER — SIMETHICONE 80 MG
80 TABLET,CHEWABLE ORAL 4 TIMES DAILY PRN
Status: DISCONTINUED | OUTPATIENT
Start: 2024-11-26 | End: 2024-11-29 | Stop reason: HOSPADM

## 2024-11-26 RX ORDER — FENTANYL CITRATE 50 UG/ML
INJECTION, SOLUTION INTRAMUSCULAR; INTRAVENOUS AS NEEDED
Status: DISCONTINUED | OUTPATIENT
Start: 2024-11-26 | End: 2024-11-26

## 2024-11-26 RX ORDER — CALCIUM CARBONATE 500 MG/1
1000 TABLET, CHEWABLE ORAL DAILY PRN
Status: DISCONTINUED | OUTPATIENT
Start: 2024-11-26 | End: 2024-11-29 | Stop reason: HOSPADM

## 2024-11-26 RX ORDER — SODIUM CHLORIDE, SODIUM LACTATE, POTASSIUM CHLORIDE, CALCIUM CHLORIDE 600; 310; 30; 20 MG/100ML; MG/100ML; MG/100ML; MG/100ML
125 INJECTION, SOLUTION INTRAVENOUS CONTINUOUS
Status: DISCONTINUED | OUTPATIENT
Start: 2024-11-26 | End: 2024-11-29 | Stop reason: HOSPADM

## 2024-11-26 RX ORDER — SENNOSIDES 8.6 MG
1 TABLET ORAL DAILY
Status: DISCONTINUED | OUTPATIENT
Start: 2024-11-27 | End: 2024-11-29 | Stop reason: HOSPADM

## 2024-11-26 RX ORDER — METOCLOPRAMIDE HYDROCHLORIDE 5 MG/ML
5 INJECTION INTRAMUSCULAR; INTRAVENOUS EVERY 6 HOURS PRN
Status: ACTIVE | OUTPATIENT
Start: 2024-11-26 | End: 2024-11-27

## 2024-11-26 RX ORDER — BENZOCAINE/MENTHOL 6 MG-10 MG
1 LOZENGE MUCOUS MEMBRANE DAILY PRN
Status: DISCONTINUED | OUTPATIENT
Start: 2024-11-26 | End: 2024-11-29 | Stop reason: HOSPADM

## 2024-11-26 RX ORDER — NALOXONE HYDROCHLORIDE 0.4 MG/ML
0.1 INJECTION, SOLUTION INTRAMUSCULAR; INTRAVENOUS; SUBCUTANEOUS
Status: ACTIVE | OUTPATIENT
Start: 2024-11-26 | End: 2024-11-27

## 2024-11-26 RX ORDER — KETOROLAC TROMETHAMINE 30 MG/ML
INJECTION, SOLUTION INTRAMUSCULAR; INTRAVENOUS AS NEEDED
Status: DISCONTINUED | OUTPATIENT
Start: 2024-11-26 | End: 2024-11-26

## 2024-11-26 RX ORDER — KETOROLAC TROMETHAMINE 30 MG/ML
30 INJECTION, SOLUTION INTRAMUSCULAR; INTRAVENOUS EVERY 6 HOURS SCHEDULED
Status: DISPENSED | OUTPATIENT
Start: 2024-11-26 | End: 2024-11-28

## 2024-11-26 RX ORDER — ONDANSETRON 2 MG/ML
4 INJECTION INTRAMUSCULAR; INTRAVENOUS EVERY 8 HOURS PRN
Status: DISCONTINUED | OUTPATIENT
Start: 2024-11-26 | End: 2024-11-29 | Stop reason: HOSPADM

## 2024-11-26 RX ORDER — ONDANSETRON 2 MG/ML
4 INJECTION INTRAMUSCULAR; INTRAVENOUS EVERY 8 HOURS PRN
Status: DISCONTINUED | OUTPATIENT
Start: 2024-11-26 | End: 2024-11-26 | Stop reason: SDUPTHER

## 2024-11-26 RX ORDER — OXYTOCIN/RINGER'S LACTATE 30/500 ML
62.5 PLASTIC BAG, INJECTION (ML) INTRAVENOUS ONCE
Status: COMPLETED | OUTPATIENT
Start: 2024-11-26 | End: 2024-11-27

## 2024-11-26 RX ORDER — DIPHENHYDRAMINE HYDROCHLORIDE 50 MG/ML
25 INJECTION INTRAMUSCULAR; INTRAVENOUS EVERY 6 HOURS PRN
Status: DISCONTINUED | OUTPATIENT
Start: 2024-11-26 | End: 2024-11-29 | Stop reason: HOSPADM

## 2024-11-26 RX ORDER — DEXAMETHASONE SODIUM PHOSPHATE 10 MG/ML
INJECTION, SOLUTION INTRAMUSCULAR; INTRAVENOUS AS NEEDED
Status: DISCONTINUED | OUTPATIENT
Start: 2024-11-26 | End: 2024-11-26

## 2024-11-26 RX ORDER — MORPHINE SULFATE 0.5 MG/ML
INJECTION, SOLUTION EPIDURAL; INTRATHECAL; INTRAVENOUS AS NEEDED
Status: DISCONTINUED | OUTPATIENT
Start: 2024-11-26 | End: 2024-11-26

## 2024-11-26 RX ORDER — NALBUPHINE HYDROCHLORIDE 10 MG/ML
5 INJECTION INTRAMUSCULAR; INTRAVENOUS; SUBCUTANEOUS
Status: ACTIVE | OUTPATIENT
Start: 2024-11-26 | End: 2024-11-27

## 2024-11-26 RX ORDER — ACETAMINOPHEN 325 MG/1
650 TABLET ORAL EVERY 6 HOURS SCHEDULED
Status: DISCONTINUED | OUTPATIENT
Start: 2024-11-27 | End: 2024-11-29 | Stop reason: HOSPADM

## 2024-11-26 RX ORDER — CEFAZOLIN SODIUM 1 G/50ML
1000 SOLUTION INTRAVENOUS ONCE
Status: COMPLETED | OUTPATIENT
Start: 2024-11-26 | End: 2024-11-26

## 2024-11-26 RX ORDER — OXYTOCIN/RINGER'S LACTATE 30/500 ML
PLASTIC BAG, INJECTION (ML) INTRAVENOUS CONTINUOUS PRN
Status: DISCONTINUED | OUTPATIENT
Start: 2024-11-26 | End: 2024-11-26

## 2024-11-26 RX ORDER — ONDANSETRON 2 MG/ML
4 INJECTION INTRAMUSCULAR; INTRAVENOUS EVERY 6 HOURS PRN
Status: DISCONTINUED | OUTPATIENT
Start: 2024-11-26 | End: 2024-11-26

## 2024-11-26 RX ORDER — OXYCODONE HYDROCHLORIDE 10 MG/1
10 TABLET ORAL EVERY 4 HOURS PRN
Status: DISCONTINUED | OUTPATIENT
Start: 2024-11-27 | End: 2024-11-29 | Stop reason: HOSPADM

## 2024-11-26 RX ADMIN — SODIUM CHLORIDE, SODIUM LACTATE, POTASSIUM CHLORIDE, AND CALCIUM CHLORIDE: .6; .31; .03; .02 INJECTION, SOLUTION INTRAVENOUS at 15:38

## 2024-11-26 RX ADMIN — PHENYLEPHRINE HYDROCHLORIDE 50 MCG/MIN: 50 INJECTION INTRAVENOUS at 14:44

## 2024-11-26 RX ADMIN — KETOROLAC TROMETHAMINE 30 MG: 30 INJECTION, SOLUTION INTRAMUSCULAR; INTRAVENOUS at 22:15

## 2024-11-26 RX ADMIN — DIPHENHYDRAMINE HYDROCHLORIDE 25 MG: 50 INJECTION, SOLUTION INTRAMUSCULAR; INTRAVENOUS at 22:15

## 2024-11-26 RX ADMIN — KETOROLAC TROMETHAMINE 15 MG: 30 INJECTION, SOLUTION INTRAMUSCULAR; INTRAVENOUS at 15:32

## 2024-11-26 RX ADMIN — ONDANSETRON 4 MG: 2 INJECTION, SOLUTION INTRAMUSCULAR; INTRAVENOUS at 14:46

## 2024-11-26 RX ADMIN — MORPHINE SULFATE 0.15 MG: 0.5 INJECTION, SOLUTION EPIDURAL; INTRATHECAL; INTRAVENOUS at 14:43

## 2024-11-26 RX ADMIN — ACETAMINOPHEN 650 MG: 325 TABLET, FILM COATED ORAL at 19:49

## 2024-11-26 RX ADMIN — SODIUM CHLORIDE, SODIUM LACTATE, POTASSIUM CHLORIDE, AND CALCIUM CHLORIDE 1000 ML: .6; .31; .03; .02 INJECTION, SOLUTION INTRAVENOUS at 11:43

## 2024-11-26 RX ADMIN — CEFAZOLIN SODIUM 1000 MG: 1 SOLUTION INTRAVENOUS at 14:45

## 2024-11-26 RX ADMIN — DEXAMETHASONE SODIUM PHOSPHATE 10 MG: 10 INJECTION INTRAMUSCULAR; INTRAVENOUS at 15:12

## 2024-11-26 RX ADMIN — EPHEDRINE SULFATE 10 MG: 50 INJECTION INTRAVENOUS at 14:45

## 2024-11-26 RX ADMIN — Medication 250 MILLI-UNITS/MIN: at 15:08

## 2024-11-26 RX ADMIN — FENTANYL CITRATE 15 MCG: 50 INJECTION INTRAMUSCULAR; INTRAVENOUS at 14:43

## 2024-11-26 RX ADMIN — SODIUM CHLORIDE, SODIUM LACTATE, POTASSIUM CHLORIDE, AND CALCIUM CHLORIDE: .6; .31; .03; .02 INJECTION, SOLUTION INTRAVENOUS at 14:37

## 2024-11-26 RX ADMIN — Medication 62.5 MILLI-UNITS/MIN: at 16:41

## 2024-11-26 RX ADMIN — BUPIVACAINE HYDROCHLORIDE IN DEXTROSE 1.6 ML: 7.5 INJECTION, SOLUTION SUBARACHNOID at 14:43

## 2024-11-26 NOTE — ANESTHESIA POSTPROCEDURE EVALUATION
Post-Op Assessment Note    CV Status:  Stable  Pain Score: 0    Pain management: adequate       Mental Status:  Alert and awake   Hydration Status:  Euvolemic   PONV Controlled:  Controlled   Airway Patency:  Patent     Post Op Vitals Reviewed: Yes    No anethesia notable event occurred.    Staff: CRNA   Comments: Pt awake, alert, able to maintain own airway, VSS, report to recovery RN          Last Filed PACU Vitals:  Vitals Value Taken Time   Temp 96.6    Pulse 102    /57    Resp 16    SpO2 98% RA        Modified Elizabeth:  No data recorded

## 2024-11-26 NOTE — ANESTHESIA PROCEDURE NOTES
Spinal Block    Patient location during procedure: OR  Start time: 11/26/2024 2:43 PM  Reason for block: procedure for pain, at surgeon's request and primary anesthetic  Staffing  Performed by: Félix Rm CRNA  Authorized by: Viktor Rush MD    Preanesthetic Checklist  Completed: patient identified, IV checked, site marked, risks and benefits discussed, surgical consent, monitors and equipment checked, pre-op evaluation and timeout performed  Spinal Block  Patient position: sitting  Prep: ChloraPrep and site prepped and draped  Patient monitoring: frequent blood pressure checks, continuous pulse ox and heart rate  Approach: midline  Location: L3-4  Needle  Needle type: Pencan   Needle gauge: 24 G  Needle length: 4 in  Assessment  Sensory level: T4  Injection Assessment:  negative aspiration for heme, no paresthesia on injection and positive aspiration for clear CSF.  Post-procedure:  site cleaned

## 2024-11-26 NOTE — OP NOTE
OPERATIVE REPORT  PATIENT NAME: Ellyn Santana    :  1995  MRN: [id number]  Pt Location: [facility] L&D OR ROOM 02    SURGERY DATE: 2024    Surgeons and Role:     * Merle Rick MD - Primary     * Chasidy Keita MD - Assisting     * Rosalia Funes MD - Assisting    Preop Diagnosis:  Breech presentation, single or unspecified fetus [O32.1XX0]    Post-Op Diagnosis Codes:     * Breech presentation, single or unspecified fetus [O32.1XX0]    Procedure(s) (LRB):   SECTION () (N/A)    Specimen(s):  ID Type Source Tests Collected by Time Destination   A :  Tissue (Placenta on Hold) OB Only Placenta PLACENTA IN STORAGE Merle Rick MD 2024 1507    B :  Cord Blood Cord BLOOD GAS, VENOUS, CORD, BLOOD GAS, ARTERIAL, CORD (Canceled) Merle Rick MD 2024 1507        Surgical QBL:  Surgical QBL (mL): 769 mL      Drains:  Urethral Catheter Coude;Double-lumen;Latex 16 Fr. (Active)   Reasons to continue Urinary Catheter  Post-operative urological requirements 24 145   Site Assessment Clean;Skin intact 24   Collection Container Standard drainage bag 24   Securement Method Securing device (Describe) 24   Number of days: 0       Anesthesia Type:   * No anesthesia type entered *    Operative Indications:  Breech presentation, single or unspecified fetus [O32.1XX0]     Jose Group Classification System:  No Multiple pregnancy, No Transverse or oblique lie, Breech lie, Nulliparous +  is JOSE GROUP 6    Operative Findings:  Viable male  at 1505 with APGARs of 8 and 9 at 1 and 5 minutes, respectively. Fetus weight: 6lb 14oz.  Normal intact placenta with centrally inserted 3 vessel cord expressed at 1509.  Normal uterus, bilateral tubes and ovaries.  Fetal cord gases:  Recent Results (from the past hour)   CORD, Blood gas, venous    Collection Time: 24  3:07 PM   Result Value Ref Range    pH, Cord Westley 7.449 7.190 - 7.490    pCO2, Cord  Westley 34.6 27.0 - 43.0 mm HG    pO2, Cord Westley 19.9 15.0 - 45.0 mm HG    HCO3, Cord Westley 23.5 12.2 - 28.6 mmol/L    Base Exc, Cord Westley 0.2 (L) 1.0 - 9.0 mmol/L    O2 Cont, Cord Westley 12.5 mL/dL    O2 HGB,VENOUS CORD 52.6 %       Procedure and technique:  The patient was taken to the operating room. Spinal anesthesia was adequately established and 1g of Ancef was given for preoperative prophylaxis. The patient was then placed in the dorsal supine position with a left tilt of the hips. The patient was prepped with chlorhexidine for vaginal prep and chloraprep for abdominal prep and draped in the usual sterile fashion.    A time out was performed to confirm correct patient and correct procedure. An incision was made in the skin with a surgical scalpel, and sharp dissection was carried out over subsequent layers of tissue including the fascia, followed by the Bovie electrocautery for hemostasis. The fascia was incised at the midline, and the fascial incision was extended bilaterally using the curved Almaguer scissors.    The superior edge of the fascial incision was grasped with Kocher clamps, tented up, and the underlying rectus muscles were dissected off bluntly and sharply using the Almaguer scissors. The rectus muscles were then divided at midline, and the peritoneum was identified and entered and extended superiorly and inferiorly in blunt fashion. An Estevan retractor was placed. A transverse incision was made in the lower uterine segment using a new surgical blade. The uterine incision was extended cephalad and caudal using blunt dissection. The amniotic sac was entered and the amniotic fluid was noted to be clear.    Surgeon's hand was inserted through the hysterotomy and the fetal sacrum was palpated, elevated, and delivered through the uterine incision with the assistance of gentle fundal pressure.  Fetal body was delivered to the level of the scapula, after which time the legs delivered with gentle upwards pressure using  Pinard's maneuver. The left, anterior arm was palpated, elbow flexed, and arm swept down and across the chest for delivery. The  was rotated to the contralateral side. The contralateral arm was palpated and delivered in similar fashion. The fetal zygomatic arches were palpated and fetal head was flexed for delivery. There was no nuchal cord noted. Following delivery and appropriate delay, the cord was doubly clamped and cut after delayed cord clamping.  The infant was then passed off the table to the awaiting  staff. The  was noted to cry spontaneously and moved all extremities. Venous and arterial blood gas, cord blood, and portion of cord was obtained for analysis and routine blood testing.  The placenta delivered with uterine massage and was noted to be intact with a central cord insertion of a three-vessel cord. The placenta was sent to storage. Oxytocin was administered by IV infusion to enhance uterine contraction.  The uterus was cleared of all clots and remaining products of conception.      The uterine incision was re-approximated using 0 Monocryl in a running locked fashion. A second horizontal imbricating stitch with 0 Monocryl was applied. The uterine incision was examined and noted to be hemostatic. The abdomen was irrigated and the paracolic gutters were cleared of all clots. The uterine incision was once again re-examined and noted to be hemostatic. The rectus muscles were examined and noted to be hemostatic.  Estevan was removed. The fascia was re-approximated using 0 Vicryl in a running nonlocked fashion, two sutures that met to the left of the midlline. The subcutaneous tissue was irrigated and cleared of all clots and debris. Good hemostasis was noted with Bovie electrocautery. The subcutaneous tissue was re-approximated with 2-0 plain gut in a running fashion. The skin incision was closed with 4-0 Stratifix in a running subcuticular fashion. Good hemostasis was noted. Exofin  surgical glue was applied.    Patient tolerated the procedure well. All needle, sponge, and instrument counts were noted to be correct x2 at the end of the procedure. Patient was transferred to the recovery room in stable condition. Attending physician Dr. Rick was present for the duration of the procedure.     Complications:   None    Patient Disposition:  PACU         SIGNATURE: Rosalia Funes MD  DATE: November 26, 2024  TIME: 3:46 PM    Case and note reviewed agree.  I was present and participated in the entire case.

## 2024-11-26 NOTE — DISCHARGE INSTRUCTIONS
Pregnancy at 39 to 40 Weeks   WHAT YOU NEED TO KNOW:   What changes are happening with my body?  You are now getting close to your due date. Your due date is just an estimate of when your baby will be born. Your baby may be born before or after your due date. Your breathing may be easier if your baby has moved down into a head-down position. You may need to urinate more often because the baby may be pressing on your bladder. You may also feel more discomfort and tire easily. You may also be having trouble sleeping.  How do I care for myself at this stage of my pregnancy?       Eat a variety of healthy foods.  Healthy foods include fruits, vegetables, whole-grain breads, low-fat dairy foods, beans, lean meats, and fish. Drink liquids as directed. Ask how much liquid to drink each day and which liquids are best for you. Limit caffeine to less than 200 milligrams each day. Limit your intake of fish to 2 servings each week. Choose fish low in mercury such as canned light tuna, shrimp, salmon, cod, or tilapia. Do not  eat fish high in mercury such as swordfish, tilefish, elias mackerel, and shark.         Take prenatal vitamins as directed.  Your need for certain vitamins and minerals, such as folic acid, increases during pregnancy. Prenatal vitamins provide some of the extra vitamins and minerals you need. Prenatal vitamins may also help to decrease the risk of certain birth defects.         Rest as needed.  Put your feet up if you have swelling in your ankles and feet.         Talk to your healthcare provider about exercise.  Moderate exercise can help you stay fit. Your healthcare provider will help you plan an exercise program that is safe for you during pregnancy.         Do not smoke.  Smoking increases your risk of a miscarriage and other health problems during your pregnancy. Smoking can cause your baby to be born early or weigh less at birth. Ask your healthcare provider for information if you need help  quitting.    Do not drink alcohol.  Alcohol passes from your body to your baby through the placenta. It can affect your baby's brain development and cause fetal alcohol syndrome (FAS). FAS is a group of conditions that causes mental, behavior, and growth problems.    Talk to your healthcare provider before you take any medicines.  Many medicines may harm your baby if you take them when you are pregnant. Do not take any medicines, vitamins, herbs, or supplements without first talking to your healthcare provider. Never use illegal or street drugs (such as marijuana or cocaine) while you are pregnant.    What are some safety tips during pregnancy?   Avoid hot tubs and saunas.  Do not use a hot tub or sauna while you are pregnant, especially during your first trimester. Hot tubs and saunas may raise your baby's temperature and increase the risk of birth defects.    Avoid toxoplasmosis.  This is an infection caused by eating raw meat or being around infected cat feces. It can cause birth defects, miscarriages, and other problems. Wash your hands after you touch raw meat. Make sure any meat is well-cooked before you eat it. Avoid raw eggs and unpasteurized milk. Use gloves or ask someone else to clean your cat's litter box while you are pregnant.         Ask your healthcare provider about travel.  The most comfortable time to travel is during the second trimester. Ask your provider if you can travel after 36 weeks. You may not be able to travel in an airplane after 36 weeks. He or she may also recommend that you avoid long road trips.    What changes are happening with my baby?  Your baby is ready to be born. At birth, your baby may weigh about 6 to 9 pounds and be about 19 to 21 inches long. Your baby may be in a head-down position. Your baby will also rest lower in your abdomen.  What do I need to know about prenatal care?  Your healthcare provider will check your blood pressure and weight. You may also need the  following:  A urine test  may also be done to check for sugar and protein. These can be signs of gestational diabetes or infection. Protein in your urine may also be a sign of preeclampsia. Preeclampsia is a condition that can develop during week 20 or later of your pregnancy. It causes high blood pressure, and it can cause problems with your kidneys and other organs.    A gestational diabetes screen  may be done. Your healthcare provider may order either a 1-step or 2-step oral glucose tolerance test (OGTT).     1-step OGTT:  Your blood sugar level will be tested after you have not eaten for 8 hours (fasting). You will then be given a glucose drink. Your level will be tested again 1 hour and 2 hours after you finish the drink.    2-step OGTT:  You do not have to fast for the first part of the test. You will have the glucose drink at any time of day. Your blood sugar level will be checked 1 hour later. If your blood sugar is higher than a certain level, another test will be ordered. You will fast and your blood sugar level will be tested. You will have the glucose drink. Your blood will be tested again 1 hour, 2 hours, and 3 hours after you finish the glucose drink.    Your baby's heart rate  will be checked.    When should I seek immediate care?   You develop a severe headache that does not go away.    You have new or increased vision changes, such as blurred or spotted vision.    You have new or increased swelling in your face or hands.    You have vaginal spotting or bleeding.    Your water broke or you feel warm water gushing or trickling from your vagina.    When should I call my obstetrician?   You have more than 5 contractions in 1 hour.    You notice any changes in your baby's movements.    You have abdominal cramps, pressure, or tightening.    You have a change in vaginal discharge.    You have chills or a fever.    You have vaginal itching, burning, or pain.    You have yellow, green, white, or  foul-smelling vaginal discharge.    You have pain or burning when you urinate, less urine than usual, or pink or bloody urine.    You have questions or concerns about your condition or care.    CARE AGREEMENT:   You have the right to help plan your care. Learn about your health condition and how it may be treated. Discuss treatment options with your healthcare providers to decide what care you want to receive. You always have the right to refuse treatment. The above information is an  only. It is not intended as medical advice for individual conditions or treatments. Talk to your doctor, nurse or pharmacist before following any medical regimen to see if it is safe and effective for you.  © Copyright [employer] 2022 Information is for End User's use only and may not be sold, redistributed or otherwise used for commercial purposes. All illustrations and images included in CareNotes® are the copyrighted property of [employer] Inc[employer] or [employer]

## 2024-11-26 NOTE — PLAN OF CARE
Problem: PAIN - ADULT  Goal: Verbalizes/displays adequate comfort level or baseline comfort level  Description: Interventions:  - Encourage patient to monitor pain and request assistance  - Assess pain using appropriate pain scale  - Administer analgesics based on type and severity of pain and evaluate response  - Implement non-pharmacological measures as appropriate and evaluate response  - Consider cultural and social influences on pain and pain management  - Notify physician/advanced practitioner if interventions unsuccessful or patient reports new pain  Outcome: Progressing     Problem: INFECTION - ADULT  Goal: Absence or prevention of progression during hospitalization  Description: INTERVENTIONS:  - Assess and monitor for signs and symptoms of infection  - Monitor lab/diagnostic results  - Monitor all insertion sites, i.e. indwelling lines, tubes, and drains  - Monitor endotracheal if appropriate and nasal secretions for changes in amount and color  - [place] appropriate cooling/warming therapies per order  - Administer medications as ordered  - Instruct and encourage patient and family to use good hand hygiene technique  - Identify and instruct in appropriate isolation precautions for identified infection/condition  Outcome: Progressing  Goal: Absence of fever/infection during neutropenic period  Description: INTERVENTIONS:  - Monitor WBC    Outcome: Progressing     Problem: SAFETY ADULT  Goal: Patient will remain free of falls  Description: INTERVENTIONS:  - Educate patient/family on patient safety including physical limitations  - Instruct patient to call for assistance with activity   - Consult OT/PT to assist with strengthening/mobility   - Keep Call bell within reach  - Keep bed low and locked with side rails adjusted as appropriate  - Keep care items and personal belongings within reach  - Initiate and maintain comfort rounds  - Apply yellow socks and bracelet for high fall risk patients  - Consider  moving patient to room near nurses station  Outcome: Progressing  Goal: Maintain or return to baseline ADL function  Description: INTERVENTIONS:  -  Assess patient's ability to carry out ADLs; assess patient's baseline for ADL function and identify physical deficits which impact ability to perform ADLs (bathing, care of mouth/teeth, toileting, grooming, dressing, etc.)  - Assess/evaluate cause of self-care deficits   - Assess range of motion  - Assess patient's mobility; develop plan if impaired  - Assess patient's need for assistive devices and provide as appropriate  - Encourage maximum independence but intervene and supervise when necessary  - Involve family in performance of ADLs  - Assess for home care needs following discharge   - Consider OT consult to assist with ADL evaluation and planning for discharge  - Provide patient education as appropriate  Outcome: Progressing  Goal: Maintains/Returns to pre admission functional level  Description: INTERVENTIONS:  - Perform AM-PAC 6 Click Basic Mobility/ Daily Activity assessment daily.  - Set and communicate daily mobility goal to care team and patient/family/caregiver.   - Collaborate with rehabilitation services on mobility goals if consulted  - Out of bed for toileting  - Record patient progress and toleration of activity level   Outcome: Progressing     Problem: Knowledge Deficit  Goal: Patient/family/caregiver demonstrates understanding of disease process, treatment plan, medications, and discharge instructions  Description: Complete learning assessment and assess knowledge base.  Interventions:  - Provide teaching at level of understanding  - Provide teaching via preferred learning methods  Outcome: Progressing     Problem: DISCHARGE PLANNING  Goal: Discharge to home or other facility with appropriate resources  Description: INTERVENTIONS:  - Identify barriers to discharge w/patient and caregiver  - Arrange for needed discharge resources and transportation as  appropriate  - Identify discharge learning needs (meds, wound care, etc.)  - Arrange for interpretive services to assist at discharge as needed  - Refer to Case Management Department for coordinating discharge planning if the patient needs post-hospital services based on physician/advanced practitioner order or complex needs related to functional status, cognitive ability, or social support system  Outcome: Progressing

## 2024-11-26 NOTE — DISCHARGE SUMMARY
Discharge Summary - OB/GYN  Ellyn Santana 28 y.o. female MRN: [id number]  Unit/Bed#: LD OR 2- Encounter: [id number]    Admission Date: 2024     Discharge Date: 2024    Admitting Attending: Dr. Rick    Delivering Attending: Dr. Rick    Discharging Attending: Dr Tinoco    Principal Diagnosis: Pregnancy at 39w1d    Secondary Diagnosis:   1. Breech presentation     Procedures: primary  section, low transverse incision    Anesthesia: spinal    Hospital course: Ellyn Santana is a 28 y.o.  at 39w1d who was admitted for scheduled 1LTCS for breech presentation. She was counseled on risks, benefits, alternative treatment options and decided to proceed with 1LTCS.     She delivered a viable male  on 2024 at 1506. Weight 6lbs 14oz via primary low transverse  section.  Apgars were 8 (1 min) and 9 (5 min).  was transferred to  nursery. Patient tolerated the procedure well.     Her post-delivery course was uncomplicated. Her starting Hgb was 13.9. Post op hgb was 9.6. Her postpartum pain was well controlled with oral analgesics.    On day of discharge, she was ambulating and able to reasonably perform all ADLs. She was voiding and had appropriate bowel function. Pain was well controlled. She was meeting appropriate post operative and post partum milestones. She was discharged home on postpartum day #3 without complications. Patient was instructed to follow up with her OB as an outpatient and was given appropriate warnings to call provider if she develops signs of infection or uncontrolled pain.    Complications: none apparent    Condition at discharge: good     Discharge instructions/Information to patient and family:   See after visit summary for information provided to patient and family.      Provisions for Follow-Up Care:  See after visit summary for information related to follow-up care and any pertinent home health orders.      Disposition: See After Visit Summary for  discharge disposition information.    Planned Readmission: No    Discharge medications and instructions:   Please see AVS for full list of medications upon discharge.      Rosalia Funes  PGY-1 OB/GYN  11/29/24  6:50 AM

## 2024-11-26 NOTE — ANESTHESIA POSTPROCEDURE EVALUATION
Post-Op Assessment Note    CV Status:  Stable  Pain Score: 0    Pain management: adequate       Mental Status:  Alert and awake   Hydration Status:  Euvolemic   PONV Controlled:  Controlled   Airway Patency:  Patent     Post Op Vitals Reviewed: Yes    No anethesia notable event occurred.    Staff: Anesthesiologist           Last Filed PACU Vitals:  Vitals Value Taken Time   Temp 96.6 °F (35.9 °C) 11/26/24 1548   Pulse 97 11/26/24 1710   /63 11/26/24 1700   Resp 18 11/26/24 1548   SpO2 95 % 11/26/24 1710   Vitals shown include unfiled device data.    Modified Elizabeth:  Activity: 2 (11/26/2024  4:33 PM)  Respiration: 2 (11/26/2024  4:33 PM)  Circulation: 2 (11/26/2024  4:33 PM)  Consciousness: 2 (11/26/2024  4:33 PM)  Oxygen Saturation: 2 (11/26/2024  4:33 PM)  Modified Elizabeth Score: 10 (11/26/2024  4:33 PM)

## 2024-11-26 NOTE — ASSESSMENT & PLAN NOTE
QBL: 769cc; admission Hb 13.9g/dL -> 9.6g/dL s/p venofer. Patient progressing toward postoperative milestones.  - Continue routine postoperative care  - Pain management with oral analgesics  - DVT Ppx: SCDs; encourage ambulation  - Void trial passed  - Encourage breastfeeding, familial bonding

## 2024-11-26 NOTE — H&P
H & P- Obstetrics   Ellyn Santana 28 y.o. female MRN: [id number]  Unit/Bed#: LD PACU-03 Encounter: [id number]    Assessment: 28 y.o.  at 39w1d admitted for scheduled primary low transverse  section.  FHT: baseline 135 bpm, mod variabilty, no decels, 15x15 accels present  Clinical EFW: 16% at 36w2d ; Breech onfirmed by TAUS  GBS status: neg   Postpartum contraception plan: condoms    Plan:   * 39 weeks gestation of pregnancy  Assessment & Plan  Admit to OBGYN   NPO  F/u T&S, CBC, RPR   IVF LR 125cc/hr   Continuous fetal monitoring and tocometry   Analgesia at maternal request   Breech by TAUS    Proceed to OR for scheduled procedure        Discussed case and plan w/ Dr. Rick      Chief Complaint: primary low transverse  section    HPI: Ellyn Santana is a 28 y.o.  with an REESE of 2024, by Ultrasound at 39w1d who is being admitted for scheduled . She denies having uterine contractions, has no LOF, and reports no VB. She states she has felt good FM.    Patient Active Problem List   Diagnosis    History of miscarriage    39 weeks gestation of pregnancy    Prenatal care, third trimester    Uterine size date discrepancy       Review of Systems   Constitutional:  Negative for chills and fever.   HENT:  Negative for ear pain and sore throat.    Eyes:  Negative for pain and visual disturbance.   Respiratory:  Negative for cough and shortness of breath.    Cardiovascular:  Negative for chest pain and palpitations.   Gastrointestinal:  Negative for abdominal pain and vomiting.   Genitourinary:  Negative for dysuria and hematuria.   Musculoskeletal:  Negative for arthralgias and back pain.   Skin:  Negative for color change and rash.   Neurological:  Negative for seizures and syncope.   All other systems reviewed and are negative.      OB Hx:  OB History    Para Term  AB Living   3    2 0   SAB IAB Ectopic Multiple Live Births   2 0 0        # Outcome Date GA Lbr [place]/2nd Weight  Sex Type Anes PTL Lv   3 Current            2 SAB 2023 5w0d          1 SAB 23 6w0d              Past Medical Hx:  Past Medical History:   Diagnosis Date    Miscarriage 23, 23    Spontaneous    Varicella     as a child       Past Surgical hx:  Past Surgical History:   Procedure Laterality Date    WISDOM TOOTH EXTRACTION           No Known Allergies      Medications Prior to Admission:     Prenatal Multivit-Min-Fe-FA (PRE- PO)    Objective:  Temp:  [97.9 °F (36.6 °C)] 97.9 °F (36.6 °C)  HR:  [77] 77  BP: (105)/(61) 105/61  Resp:  [18] 18  Body mass index is 26.16 kg/m².     Physical Exam:  OBGyn Exam       FHT:   Reactive    TOCO:    Np ctx    Lab Results   Component Value Date    WBC 8.14 2024    HGB 13.9 2024    HCT 41.2 2024     2024     Lab Results   Component Value Date    K 3.8 12/15/2023     12/15/2023    CO2 27 12/15/2023    BUN 21 12/15/2023    CREATININE 0.74 12/15/2023     Prenatal Labs: Reviewed      Blood type: A pos  Antibody: neg  GBS: neg  HIV: neg  Rubella: neg  Syphilis IgM/IgG: neg  HBsAg: neg  HCAb: neg  Chlamydia: neg  Gonorrhea: neg  Diabetes 1 hour screen: 138  3 hour glucose: passed  Platelets: 239  Hgb: 13.9  >2 Midnights  INPATIENT     Signature/Title: Rosalia Funes MD  Date: 2024  Time: 12:24 PM

## 2024-11-26 NOTE — ANESTHESIA PREPROCEDURE EVALUATION
Procedure:   SECTION () (Uterus)    Relevant Problems   ANESTHESIA (within normal limits)      CARDIO (within normal limits)      ENDO (within normal limits)      GI/HEPATIC (within normal limits)      /RENAL (within normal limits)      GYN   (+) 39 weeks gestation of pregnancy      HEMATOLOGY (within normal limits)      MUSCULOSKELETAL (within normal limits)      NEURO/PSYCH (within normal limits)      PULMONARY (within normal limits)        Physical Exam    Airway    Mallampati score: II         Dental       Cardiovascular  Cardiovascular exam normal    Pulmonary  Pulmonary exam normal     Other Findings  post-pubertal.      Anesthesia Plan  ASA Score- 2     Anesthesia Type- spinal with ASA Monitors.         Additional Monitors:     Airway Plan:            Plan Factors-Exercise tolerance (METS): >4 METS.    Chart reviewed.    Patient summary reviewed.    Patient is not a current smoker. Patient not instructed to abstain from smoking on day of procedure. Patient did not smoke on day of surgery.            Induction-     Postoperative Plan-     Perioperative Resuscitation Plan - Level 1 - Full Code.       Informed Consent- Anesthetic plan and risks discussed with patient.  I personally reviewed this patient with the CRNA. Discussed and agreed on the Anesthesia Plan with the CRNA..

## 2024-11-27 PROBLEM — D64.9 ANEMIA: Status: ACTIVE | Noted: 2024-11-27

## 2024-11-27 LAB
ERYTHROCYTE [DISTWIDTH] IN BLOOD BY AUTOMATED COUNT: 13.2 % (ref 11.6–15.1)
HCT VFR BLD AUTO: 27.7 % (ref 34.8–46.1)
HGB BLD-MCNC: 9.6 G/DL (ref 11.5–15.4)
MCH RBC QN AUTO: 31.9 PG (ref 26.8–34.3)
MCHC RBC AUTO-ENTMCNC: 34.4 G/DL (ref 31.4–37.4)
MCV RBC AUTO: 93 FL (ref 82–98)
PLATELET # BLD AUTO: 178 THOUSANDS/UL (ref 149–390)
PMV BLD AUTO: 11 FL (ref 8.9–12.7)
RBC # BLD AUTO: 3.07 MILLION/UL (ref 3.81–5.12)
WBC # BLD AUTO: 19.87 THOUSAND/UL (ref 4.31–10.16)

## 2024-11-27 PROCEDURE — 99024 POSTOP FOLLOW-UP VISIT: CPT | Performed by: PHYSICIAN ASSISTANT

## 2024-11-27 PROCEDURE — 85027 COMPLETE CBC AUTOMATED: CPT

## 2024-11-27 RX ADMIN — ACETAMINOPHEN 650 MG: 325 TABLET, FILM COATED ORAL at 08:30

## 2024-11-27 RX ADMIN — KETOROLAC TROMETHAMINE 30 MG: 30 INJECTION, SOLUTION INTRAMUSCULAR; INTRAVENOUS at 04:24

## 2024-11-27 RX ADMIN — ACETAMINOPHEN 650 MG: 325 TABLET, FILM COATED ORAL at 21:13

## 2024-11-27 RX ADMIN — KETOROLAC TROMETHAMINE 30 MG: 30 INJECTION, SOLUTION INTRAMUSCULAR; INTRAVENOUS at 10:57

## 2024-11-27 RX ADMIN — IRON SUCROSE 200 MG: 20 INJECTION, SOLUTION INTRAVENOUS at 08:52

## 2024-11-27 RX ADMIN — ACETAMINOPHEN 650 MG: 325 TABLET, FILM COATED ORAL at 14:48

## 2024-11-27 RX ADMIN — KETOROLAC TROMETHAMINE 30 MG: 30 INJECTION, SOLUTION INTRAMUSCULAR; INTRAVENOUS at 16:32

## 2024-11-27 RX ADMIN — ACETAMINOPHEN 650 MG: 325 TABLET, FILM COATED ORAL at 01:50

## 2024-11-27 RX ADMIN — Medication 1 TABLET: at 08:31

## 2024-11-27 RX ADMIN — SENNOSIDES 8.6 MG: 8.6 TABLET, FILM COATED ORAL at 08:31

## 2024-11-27 NOTE — PLAN OF CARE
Problem: PAIN - ADULT  Goal: Verbalizes/displays adequate comfort level or baseline comfort level  Description: Interventions:  - Encourage patient to monitor pain and request assistance  - Assess pain using appropriate pain scale  - Administer analgesics based on type and severity of pain and evaluate response  - Implement non-pharmacological measures as appropriate and evaluate response  - Consider cultural and social influences on pain and pain management  - Notify physician/advanced practitioner if interventions unsuccessful or patient reports new pain  Outcome: Progressing     Problem: INFECTION - ADULT  Goal: Absence or prevention of progression during hospitalization  Description: INTERVENTIONS:  - Assess and monitor for signs and symptoms of infection  - Monitor lab/diagnostic results  - Monitor all insertion sites, i.e. indwelling lines, tubes, and drains  - Monitor endotracheal if appropriate and nasal secretions for changes in amount and color  - [place] appropriate cooling/warming therapies per order  - Administer medications as ordered  - Instruct and encourage patient and family to use good hand hygiene technique  - Identify and instruct in appropriate isolation precautions for identified infection/condition  Outcome: Progressing  Goal: Absence of fever/infection during neutropenic period  Description: INTERVENTIONS:  - Monitor WBC    Outcome: Progressing     Problem: SAFETY ADULT  Goal: Patient will remain free of falls  Description: INTERVENTIONS:  - Educate patient/family on patient safety including physical limitations  - Instruct patient to call for assistance with activity   - Consult OT/PT to assist with strengthening/mobility   - Keep Call bell within reach  - Keep bed low and locked with side rails adjusted as appropriate  - Keep care items and personal belongings within reach  - Initiate and maintain comfort rounds  - Make Fall Risk Sign visible to staff  - Offer Toileting every  Hours,  in advance of need  - Initiate/Maintain alarm  - Obtain necessary fall risk management equipment:   - Apply yellow socks and bracelet for high fall risk patients  - Consider moving patient to room near nurses station  Outcome: Progressing  Goal: Maintain or return to baseline ADL function  Description: INTERVENTIONS:  -  Assess patient's ability to carry out ADLs; assess patient's baseline for ADL function and identify physical deficits which impact ability to perform ADLs (bathing, care of mouth/teeth, toileting, grooming, dressing, etc.)  - Assess/evaluate cause of self-care deficits   - Assess range of motion  - Assess patient's mobility; develop plan if impaired  - Assess patient's need for assistive devices and provide as appropriate  - Encourage maximum independence but intervene and supervise when necessary  - Involve family in performance of ADLs  - Assess for home care needs following discharge   - Consider OT consult to assist with ADL evaluation and planning for discharge  - Provide patient education as appropriate  Outcome: Progressing  Goal: Maintains/Returns to pre admission functional level  Description: INTERVENTIONS:  - Perform AM-PAC 6 Click Basic Mobility/ Daily Activity assessment daily.  - Set and communicate daily mobility goal to care team and patient/family/caregiver.   - Collaborate with rehabilitation services on mobility goals if consulted  - Perform Range of Motion  times a day.  - Reposition patient every  hours.  - Dangle patient  times a day  - Stand patient  times a day  - Ambulate patient  times a day  - Out of bed to chair  times a day   - Out of bed for meals  times a day  - Out of bed for toileting  - Record patient progress and toleration of activity level   Outcome: Progressing     Problem: Knowledge Deficit  Goal: Patient/family/caregiver demonstrates understanding of disease process, treatment plan, medications, and discharge instructions  Description: Complete learning  assessment and assess knowledge base.  Interventions:  - Provide teaching at level of understanding  - Provide teaching via preferred learning methods  Outcome: Progressing     Problem: DISCHARGE PLANNING  Goal: Discharge to home or other facility with appropriate resources  Description: INTERVENTIONS:  - Identify barriers to discharge w/patient and caregiver  - Arrange for needed discharge resources and transportation as appropriate  - Identify discharge learning needs (meds, wound care, etc.)  - Arrange for interpretive services to assist at discharge as needed  - Refer to Case Management Department for coordinating discharge planning if the patient needs post-hospital services based on physician/advanced practitioner order or complex needs related to functional status, cognitive ability, or social support system  Outcome: Progressing     Problem: POSTPARTUM  Goal: Experiences normal postpartum course  Description: INTERVENTIONS:  - Monitor maternal vital signs  - Assess uterine involution and lochia  Outcome: Progressing  Goal: Appropriate maternal -  bonding  Description: INTERVENTIONS:  - Identify family support  - Assess for appropriate maternal/infant bonding   -Encourage maternal/infant bonding opportunities  - Referral to  or  as needed  Outcome: Progressing  Goal: Establishment of infant feeding pattern  Description: INTERVENTIONS:  - Assess breast/bottle feeding  - Refer to lactation as needed  Outcome: Progressing  Goal: Incision(s), wounds(s) or drain site(s) healing without S/S of infection  Description: INTERVENTIONS  - Assess and document dressing, incision, wound bed, drain sites and surrounding tissue  - Provide patient and family education  - Perform skin care/dressing changes every   Outcome: Progressing

## 2024-11-27 NOTE — LACTATION NOTE
This note was copied from a baby's chart.  CONSULT - LACTATION  Baby Boy (Annie Santana 1 days male MRN: [id number]    [facility] NURSERY Room / Bed: (N)/(N) Encounter: [id number]    Maternal Information     MOTHER:  Ellyn Santana  Maternal Age: 28 y.o.  OB History: # 1 - Date: 23, Sex: None, Weight: None, GA: 6w0d, Type: None, Apgar1: None, Apgar5: None, Living: None, Birth Comments: None    # 2 - Date: 2023, Sex: None, Weight: None, GA: 5w0d, Type: None, Apgar1: None, Apgar5: None, Living: None, Birth Comments: None    # 3 - Date: 24, Sex: Male, Weight: 3130 g (6 lb 14.4 oz), GA: 39w1d, Type: , Low Transverse, Apgar1: 8, Apgar5: 9, Living: Living, Birth Comments: None   Previouse breast reduction surgery? No    Lactation history:   Has patient previously breast fed: No   How long had patient previously breast fed:     Previous breast feeding complications:       Past Surgical History:   Procedure Laterality Date    [place]  DELIVERY ONLY N/A 2024    Procedure:  SECTION ();  Surgeon: Merle Rick MD;  Location: [facility] ;  Service: Obstetrics    WISDOM TOOTH EXTRACTION         Birth information:  YOB: 2026   Time of birth: 3:06 PM   Sex: male   Delivery type: , Low Transverse   Birth Weight: 3130 g (6 lb 14.4 oz)   Percent of Weight Change: -1%     Gestational Age: 39w1d   [unfilled]    Assessment     Breast and nipple assessment:  small round breasts with small areolas. Door knob nipples      Assessment: normal assessment    Feeding assessment: feeding well  LATCH:  Latch: Grasps breast, tongue down, lips flanged, rhythmic sucking   Audible Swallowing: Spontaneous and intermittent (24 hours old)   Type of Nipple: Everted (After stimulation)   Comfort (Breast/Nipple): Soft/non-tender   Hold (Positioning): Partial assist, teach one side, mother does other, staff holds   LATCH Score: 9            11/27/24 1530   Lactation Consultation   Reason for Consult 20;20 min   Maternal Information   Has mother  before? No   Infant to breast within first hour of birth? Yes   Exclusive Pump and Bottle Feed No   LATCH Documentation   Latch 2   Audible Swallowing 2   Type of Nipple 2   Comfort (Breast/Nipple) 2   Hold (Positioning) 1   LATCH Score 9   Having latch problems? No   Position(s) Used Football   Breasts/Nipples   Left Breast Soft   Right Breast Soft   Left Nipple Everted   Right Nipple Everted   Intervention Hand expression   Breastfeeding Progress Not yet established;Breastfeeding well   Breast Pump   Pump 3  (Spectra Pump)   Patient Follow-Up   Lactation Consult Status 2   Follow-Up Type Inpatient;Call as needed   Other OB Lactation Documentation    Additional Problem Noted Initial Consult: Assisted mom in latching baby to breast in cross cradle hold. Baby latched deeply but enc snug hold from mom. Repositioned in football hold. Demonstration with teach back of deep latch. Enc to call for further assistance.     Feeding recommendations:  breast feed on demand  Assisted mom in latching baby to breast in cross cradle hold. Baby latched deeply but enc snug hold from mom. Repositioned in football hold on right breast. Mom more comfortable. Demonstration with teach back of deep latch. Baby actively sucking with swallows. Mom transitioned to left breast in football hold and latched baby deeply. Reviewed proper position and alignment. Reviewed cluster feedings. Enc to call for further assistance.    Provided demonstration, education and support of deep latch to breast by placing the nipple to the nose, dragging down to chin to achieve a wide latch. Bring baby to the breast, not breast to baby. Move your shoulders down and away from your ears. Look for ear, shoulder, hip alignment. Baby's upper and lower lip should be flanged on the breast.    Discussed 2nd night syndrome and ways to calm infant. Hand  out given. Information on hand expression given. Discussed benefits of knowing how to manually express breast including stimulating milk supply, softening nipple for latch and evacuating breast in the event of engorgement.    Encouraged parents to call for assistance, questions, and concerns about breastfeeding.  Extension provided.      Mary Ann Conti MA 11/27/2024 4:10 PM

## 2024-11-27 NOTE — PROGRESS NOTES
"Progress Note - OB/GYN  Ellyn Santana 28 y.o. female MRN: [id number]  Unit/Bed#: -01 Encounter: [id number]    Assessment and Plan     Ellyn Santana is a patient of: [facility]. She is PPD# 1 s/p  primary  section, low transverse incision  Recovering well and is stable       Anemia  Assessment & Plan  Venofer ordered and begun 24    * Status post primary low transverse  section  Assessment & Plan  Primary LTCS due to breech presentation  Plan barrier method for BC        Disposition    - Anticipate discharge home on PPD# 2-3      Subjective/Objective     Chief Complaint: Postpartum State     Subjective:    Ellyn Santana is PPD/POD#1 s/p  primary  section, low transverse incision. She has no current complaints.  Pain is well controlled. She is recovering well and is stable.     Breastfeeding:  yes    Tolerating PO: yes  Nausea or Vomiting: no  Voiding: yes  Flatus: yes; has had no bowel movement.   Ambulating: yes  Chest pain: no  Shortness of breath: no  Leg pain: no  Lochia: appropriate     Vitals:   /57 (BP Location: Left arm)   Pulse 86   Temp 97.9 °F (36.6 °C) (Oral)   Resp 16   Ht 5' 2\" (1.575 m)   Wt 64.9 kg (143 lb)   LMP 2024 (Exact Date)   SpO2 97%   Breastfeeding Yes   BMI 26.16 kg/m²       Intake/Output Summary (Last 24 hours) at 2024 0924  Last data filed at 2024 0828  Gross per 24 hour   Intake 1000 ml   Output 3469 ml   Net -2469 ml       Invasive Devices       Peripheral Intravenous Line  Duration             Peripheral IV 24 Right;Ventral (anterior) Forearm <1 day                    Physical Exam:   GEN: Ellyn Santana appears well, alert and oriented x 3, pleasant and cooperative   CARDIO: RRR, no murmurs or rubs  RESP:  CTAB, no wheezes or rales  ABDOMEN: soft, no tenderness, no distention, fundus @ umb, Incision C/D/I  EXTREMITIES: non tender, no erythema, b/l Olivia's sign negative      Labs:     Hemoglobin   Date Value Ref Range " Status   11/27/2024 9.6 (L) 11.5 - 15.4 g/dL Final     Comment:     Results verified by repeat   11/26/2024 13.9 11.5 - 15.4 g/dL Final     WBC   Date Value Ref Range Status   11/27/2024 19.87 (H) 4.31 - 10.16 Thousand/uL Final     Comment:     This is an appended report.  These results have been appended to a previously preliminary verified report.   11/26/2024 8.14 4.31 - 10.16 Thousand/uL Final     Platelets   Date Value Ref Range Status   11/27/2024 178 149 - 390 Thousands/uL Final     Comment:     This is an appended report.  These results have been appended to a previously preliminary verified report.   11/26/2024 216 149 - 390 Thousands/uL Final     Creatinine   Date Value Ref Range Status   12/15/2023 0.74 0.60 - 1.30 mg/dL Final     Comment:     Standardized to IDMS reference method          Rosibel Tellez PA-C  11/27/2024  9:24 AM                   \

## 2024-11-27 NOTE — PLAN OF CARE
Problem: PAIN - ADULT  Goal: Verbalizes/displays adequate comfort level or baseline comfort level  Description: Interventions:  - Encourage patient to monitor pain and request assistance  - Assess pain using appropriate pain scale  - Administer analgesics based on type and severity of pain and evaluate response  - Implement non-pharmacological measures as appropriate and evaluate response  - Consider cultural and social influences on pain and pain management  - Notify physician/advanced practitioner if interventions unsuccessful or patient reports new pain  Outcome: Progressing     Problem: INFECTION - ADULT  Goal: Absence or prevention of progression during hospitalization  Description: INTERVENTIONS:  - Assess and monitor for signs and symptoms of infection  - Monitor lab/diagnostic results  - Monitor all insertion sites, i.e. indwelling lines, tubes, and drains  - Monitor endotracheal if appropriate and nasal secretions for changes in amount and color  - [place] appropriate cooling/warming therapies per order  - Administer medications as ordered  - Instruct and encourage patient and family to use good hand hygiene technique  - Identify and instruct in appropriate isolation precautions for identified infection/condition  Outcome: Progressing  Goal: Absence of fever/infection during neutropenic period  Description: INTERVENTIONS:  - Monitor WBC    Outcome: Progressing     Problem: SAFETY ADULT  Goal: Patient will remain free of falls  Description: INTERVENTIONS:  - Educate patient/family on patient safety including physical limitations  - Instruct patient to call for assistance with activity   - Consult OT/PT to assist with strengthening/mobility   - Keep Call bell within reach  - Keep bed low and locked with side rails adjusted as appropriate  - Keep care items and personal belongings within reach  - Initiate and maintain comfort rounds  - Make Fall Risk Sign visible to staff  - Apply yellow socks and bracelet  for high fall risk patients  - Consider moving patient to room near nurses station  Outcome: Progressing  Goal: Maintain or return to baseline ADL function  Description: INTERVENTIONS:  -  Assess patient's ability to carry out ADLs; assess patient's baseline for ADL function and identify physical deficits which impact ability to perform ADLs (bathing, care of mouth/teeth, toileting, grooming, dressing, etc.)  - Assess/evaluate cause of self-care deficits   - Assess range of motion  - Assess patient's mobility; develop plan if impaired  - Assess patient's need for assistive devices and provide as appropriate  - Encourage maximum independence but intervene and supervise when necessary  - Involve family in performance of ADLs  - Assess for home care needs following discharge   - Consider OT consult to assist with ADL evaluation and planning for discharge  - Provide patient education as appropriate  Outcome: Progressing  Goal: Maintains/Returns to pre admission functional level  Description: INTERVENTIONS:  - Perform AM-PAC 6 Click Basic Mobility/ Daily Activity assessment daily.  - Set and communicate daily mobility goal to care team and patient/family/caregiver.   - Collaborate with rehabilitation services on mobility goals if consulted  - Out of bed for toileting  - Record patient progress and toleration of activity level   Outcome: Progressing     Problem: Knowledge Deficit  Goal: Patient/family/caregiver demonstrates understanding of disease process, treatment plan, medications, and discharge instructions  Description: Complete learning assessment and assess knowledge base.  Interventions:  - Provide teaching at level of understanding  - Provide teaching via preferred learning methods  Outcome: Progressing     Problem: DISCHARGE PLANNING  Goal: Discharge to home or other facility with appropriate resources  Description: INTERVENTIONS:  - Identify barriers to discharge w/patient and caregiver  - Arrange for needed  discharge resources and transportation as appropriate  - Identify discharge learning needs (meds, wound care, etc.)  - Arrange for interpretive services to assist at discharge as needed  - Refer to Case Management Department for coordinating discharge planning if the patient needs post-hospital services based on physician/advanced practitioner order or complex needs related to functional status, cognitive ability, or social support system  Outcome: Progressing     Problem: POSTPARTUM  Goal: Experiences normal postpartum course  Description: INTERVENTIONS:  - Monitor maternal vital signs  - Assess uterine involution and lochia  Outcome: Progressing  Goal: Appropriate maternal -  bonding  Description: INTERVENTIONS:  - Identify family support  - Assess for appropriate maternal/infant bonding   -Encourage maternal/infant bonding opportunities  - Referral to  or  as needed  Outcome: Progressing  Goal: Establishment of infant feeding pattern  Description: INTERVENTIONS:  - Assess breast/bottle feeding  - Refer to lactation as needed  Outcome: Progressing  Goal: Incision(s), wounds(s) or drain site(s) healing without S/S of infection  Description: INTERVENTIONS  - Assess and document dressing, incision, wound bed, drain sites and surrounding tissue  - Provide patient and family education  Outcome: Progressing

## 2024-11-27 NOTE — QUICK NOTE
" Post Op Check - OB/GYN  Ellyn Santana 28 y.o. female MRN: [id number]  Unit/Bed#: -01 Encounter: [id number]    Chief concern: POD#0 s/p primary low transverse  section for breech presentation    SUBJECTIVE:  patient reports she is doing well other than some taylor-incisional itchiness  Pain: denies  Tolerating PO: yes  Voiding: Peters in place  Chest pain: denies  Shortness of breath: denies  Lochia: wnl    OBJECTIVE:  Vitals:   /77 (BP Location: Left arm)   Pulse 105   Temp 97.7 °F (36.5 °C) (Oral)   Resp 18   Ht 5' 2\" (1.575 m)   Wt 64.9 kg (143 lb)   LMP 2024 (Exact Date)   SpO2 97%   Breastfeeding Yes   BMI 26.16 kg/m²       Intake/Output Summary (Last 24 hours) at 2024  Last data filed at 2024 1700  Gross per 24 hour   Intake 1000 ml   Output 1069 ml   Net -69 ml     Invasive Devices       Peripheral Intravenous Line  Duration             Peripheral IV 24 Right;Ventral (anterior) Forearm <1 day              Drain  Duration             Urethral Catheter Coude;Double-lumen;Latex 16 Fr. <1 day                  Physical Exam:   GEN: well-appearing, alert and oriented x3  PULMONARY: lungs CTAB  CARDIAC: normal rate and rhythm, no murmurs  ABDOMEN: soft, nontender, nondistended, fundus firm at the umbilicus, incision c/d/i  EXTREMITIES: SCDs in place, no edema    Labs:   Admission on 2024   Component Date Value    ABO Grouping 2024 A     Rh Factor 2024 Positive     Antibody Screen 2024 Negative     Specimen Expiration Date 2024129     WBC 2024 8.14     RBC 2024 4.44     Hemoglobin 2024 13.9     Hematocrit 2024 41.2     MCV 2024 93     MCH 2024 31.3     MCHC 2024 33.7     RDW 2024 13.2     Platelets 2024 216     MPV 2024 11.5     Extra Tube 2024 Hold for add-ons.     Syphilis Total Antibody 2024 Non-reactive     pH, Cord Westley 2024 7.449     pCO2, " Cord [place]ley 11/26/2024 34.6     pO2, Cord [place] 11/26/2024 19.9     HCO3, Cord [place] 11/26/2024 23.5     Base Exc, Cord [place]ley 11/26/2024 0.2 (L)     O2 Cont, Cord [place]ley 11/26/2024 12.5     O2 HGB,VENOUS CORD 11/26/2024 52.6      ASSESSMENT:  #S/p 1LTCS - postop day 0, stable    PLAN:  Hgb 13.9g/dL --> f/u AM CBC   Peters in place, urinary output 2.69cc/hr over past 4hrs   Advance diet as tolerated   Encourage ambulation   Encourage breastfeeding   Anticipate discharge POD2-4    Chacha Damico MD  OBGYN PGY-1  11/26/24  9:17 PM

## 2024-11-28 PROBLEM — Z98.891 STATUS POST PRIMARY LOW TRANSVERSE CESAREAN SECTION: Chronic | Status: ACTIVE | Noted: 2024-07-18

## 2024-11-28 PROCEDURE — 99024 POSTOP FOLLOW-UP VISIT: CPT | Performed by: STUDENT IN AN ORGANIZED HEALTH CARE EDUCATION/TRAINING PROGRAM

## 2024-11-28 RX ADMIN — Medication 1 TABLET: at 08:46

## 2024-11-28 RX ADMIN — IBUPROFEN 600 MG: 600 TABLET ORAL at 12:07

## 2024-11-28 RX ADMIN — ACETAMINOPHEN 650 MG: 325 TABLET, FILM COATED ORAL at 16:06

## 2024-11-28 RX ADMIN — ACETAMINOPHEN 650 MG: 325 TABLET, FILM COATED ORAL at 21:33

## 2024-11-28 RX ADMIN — SENNOSIDES 8.6 MG: 8.6 TABLET, FILM COATED ORAL at 08:46

## 2024-11-28 RX ADMIN — IBUPROFEN 600 MG: 600 TABLET ORAL at 18:17

## 2024-11-28 RX ADMIN — ACETAMINOPHEN 650 MG: 325 TABLET, FILM COATED ORAL at 08:46

## 2024-11-28 RX ADMIN — KETOROLAC TROMETHAMINE 30 MG: 30 INJECTION, SOLUTION INTRAMUSCULAR; INTRAVENOUS at 06:00

## 2024-11-28 RX ADMIN — ACETAMINOPHEN 650 MG: 325 TABLET, FILM COATED ORAL at 03:07

## 2024-11-28 NOTE — PLAN OF CARE
Problem: PAIN - ADULT  Goal: Verbalizes/displays adequate comfort level or baseline comfort level  Description: Interventions:  - Encourage patient to monitor pain and request assistance  - Assess pain using appropriate pain scale  - Administer analgesics based on type and severity of pain and evaluate response  - Implement non-pharmacological measures as appropriate and evaluate response  - Consider cultural and social influences on pain and pain management  - Notify physician/advanced practitioner if interventions unsuccessful or patient reports new pain  Outcome: Progressing     Problem: INFECTION - ADULT  Goal: Absence or prevention of progression during hospitalization  Description: INTERVENTIONS:  - Assess and monitor for signs and symptoms of infection  - Monitor lab/diagnostic results  - Monitor all insertion sites, i.e. indwelling lines, tubes, and drains  - Monitor endotracheal if appropriate and nasal secretions for changes in amount and color  - [place] appropriate cooling/warming therapies per order  - Administer medications as ordered  - Instruct and encourage patient and family to use good hand hygiene technique  - Identify and instruct in appropriate isolation precautions for identified infection/condition  Outcome: Progressing  Goal: Absence of fever/infection during neutropenic period  Description: INTERVENTIONS:  - Monitor WBC    Outcome: Progressing     Problem: SAFETY ADULT  Goal: Patient will remain free of falls  Description: INTERVENTIONS:  - Educate patient/family on patient safety including physical limitations  - Instruct patient to call for assistance with activity   - Consult OT/PT to assist with strengthening/mobility   - Keep Call bell within reach  - Keep bed low and locked with side rails adjusted as appropriate  - Keep care items and personal belongings within reach  - Initiate and maintain comfort rounds  - Make Fall Risk Sign visible to staff  - Offer Toileting every 2-3  Hours, in advance of need      - Apply yellow socks and bracelet for high fall risk patients  - Consider moving patient to room near nurses station  Outcome: Progressing  Goal: Maintain or return to baseline ADL function  Description: INTERVENTIONS:  -  Assess patient's ability to carry out ADLs; assess patient's baseline for ADL function and identify physical deficits which impact ability to perform ADLs (bathing, care of mouth/teeth, toileting, grooming, dressing, etc.)  - Assess/evaluate cause of self-care deficits   - Assess range of motion  - Assess patient's mobility; develop plan if impaired  - Assess patient's need for assistive devices and provide as appropriate  - Encourage maximum independence but intervene and supervise when necessary  - Involve family in performance of ADLs  - Assess for home care needs following discharge   - Consider OT consult to assist with ADL evaluation and planning for discharge  - Provide patient education as appropriate  Outcome: Progressing  Goal: Maintains/Returns to pre admission functional level  Description: INTERVENTIONS:  - Perform AM-PAC 6 Click Basic Mobility/ Daily Activity assessment daily.  - Set and communicate daily mobility goal to care team and patient/family/caregiver.   - Collaborate with rehabilitation services on mobility goals if consulted  - Out of bed for toileting  - Record patient progress and toleration of activity level   Outcome: Progressing     Problem: Knowledge Deficit  Goal: Patient/family/caregiver demonstrates understanding of disease process, treatment plan, medications, and discharge instructions  Description: Complete learning assessment and assess knowledge base.  Interventions:  - Provide teaching at level of understanding  - Provide teaching via preferred learning methods  Outcome: Progressing     Problem: DISCHARGE PLANNING  Goal: Discharge to home or other facility with appropriate resources  Description: INTERVENTIONS:  - Identify  barriers to discharge w/patient and caregiver  - Arrange for needed discharge resources and transportation as appropriate  - Identify discharge learning needs (meds, wound care, etc.)  - Arrange for interpretive services to assist at discharge as needed  - Refer to Case Management Department for coordinating discharge planning if the patient needs post-hospital services based on physician/advanced practitioner order or complex needs related to functional status, cognitive ability, or social support system  Outcome: Progressing     Problem: POSTPARTUM  Goal: Experiences normal postpartum course  Description: INTERVENTIONS:  - Monitor maternal vital signs  - Assess uterine involution and lochia  Outcome: Progressing  Goal: Appropriate maternal -  bonding  Description: INTERVENTIONS:  - Identify family support  - Assess for appropriate maternal/infant bonding   -Encourage maternal/infant bonding opportunities  - Referral to  or  as needed  Outcome: Progressing  Goal: Establishment of infant feeding pattern  Description: INTERVENTIONS:  - Assess breast/bottle feeding  - Refer to lactation as needed  Outcome: Progressing  Goal: Incision(s), wounds(s) or drain site(s) healing without S/S of infection  Description: INTERVENTIONS  - Assess and document dressing, incision, wound bed, drain sites and surrounding tissue

## 2024-11-28 NOTE — PROGRESS NOTES
"Progress Note - OB/GYN  Ellyn Santana 28 y.o. female MRN: [id number]  Unit/Bed#: -01 Encounter: [id number]    Assessment and Plan   Ellyn Santana is a patient of: [facility] . She is POD# 2 s/p primary  section, low transverse incision. Recovering well and stable.    * Status post primary low transverse  section  Assessment & Plan  QBL: 769cc; admission Hb 13.9g/dL -> 9.6g/dL s/p venofer. Patient progressing toward postoperative milestones.  - Continue routine postoperative care  - Pain management with oral analgesics  - DVT Ppx: SCDs; encourage ambulation  - Void trial passed  - Encourage breastfeeding, familial bonding      Disposition   - Anticipate discharge home on POD# 2-4    Subjective/Objective   Chief Concern: POD#2 s/p primary  section, low transverse incision  Subjective:    Ellyn Santana reports feeling tired but no current concerns. Pain is well controlled. She is voiding. She is passing flatus. She is ambulating. She is tolerating PO and denies nausea or vomitting. She denies chest pain, shortness of breath, lightheadedness. Lochia is normal. She is breastfeeding.    Vitals:   BP (!) 87/52 (BP Location: Left arm) Comment: nurse notified  Pulse 88   Temp 98 °F (36.7 °C) (Oral)   Resp 18   Ht 5' 2\" (1.575 m)   Wt 64.9 kg (143 lb)   LMP 2024 (Exact Date)   SpO2 96%   Breastfeeding Yes   BMI 26.16 kg/m²       Intake/Output Summary (Last 24 hours) at 2024 1018  Last data filed at 2024 1158  Gross per 24 hour   Intake --   Output 1000 ml   Net -1000 ml     Invasive Devices       None                 Physical Exam:   GEN: well-appearing, alert and oriented x3  RESP: nonlabored respirations on room air  ABDOMEN: soft, nontender, nodistended, fundus firm below the umbilicus, incision clean/dry/intact      Labs:   Hemoglobin   Date Value Ref Range Status   2024 9.6 (L) 11.5 - 15.4 g/dL Final     Comment:     Results verified by repeat   2024 13.9 " 11.5 - 15.4 g/dL Final     WBC   Date Value Ref Range Status   11/27/2024 19.87 (H) 4.31 - 10.16 Thousand/uL Final     Comment:     This is an appended report.  These results have been appended to a previously preliminary verified report.   11/26/2024 8.14 4.31 - 10.16 Thousand/uL Final     Platelets   Date Value Ref Range Status   11/27/2024 178 149 - 390 Thousands/uL Final     Comment:     This is an appended report.  These results have been appended to a previously preliminary verified report.   11/26/2024 216 149 - 390 Thousands/uL Final     Creatinine   Date Value Ref Range Status   12/15/2023 0.74 0.60 - 1.30 mg/dL Final     Comment:     Standardized to IDMS reference method          Rosemary Antonio MD   11/28/24   10:18 AM

## 2024-11-28 NOTE — PLAN OF CARE
Problem: PAIN - ADULT  Goal: Verbalizes/displays adequate comfort level or baseline comfort level  Description: Interventions:  - Encourage patient to monitor pain and request assistance  - Assess pain using appropriate pain scale  - Administer analgesics based on type and severity of pain and evaluate response  - Implement non-pharmacological measures as appropriate and evaluate response  - Consider cultural and social influences on pain and pain management  - Notify physician/advanced practitioner if interventions unsuccessful or patient reports new pain  Outcome: Progressing     Problem: INFECTION - ADULT  Goal: Absence or prevention of progression during hospitalization  Description: INTERVENTIONS:  - Assess and monitor for signs and symptoms of infection  - Monitor lab/diagnostic results  - Monitor all insertion sites, i.e. indwelling lines, tubes, and drains  - Monitor endotracheal if appropriate and nasal secretions for changes in amount and color  - [place] appropriate cooling/warming therapies per order  - Administer medications as ordered  - Instruct and encourage patient and family to use good hand hygiene technique  - Identify and instruct in appropriate isolation precautions for identified infection/condition  Outcome: Progressing  Goal: Absence of fever/infection during neutropenic period  Description: INTERVENTIONS:  - Monitor WBC    Outcome: Progressing     Problem: SAFETY ADULT  Goal: Patient will remain free of falls  Description: INTERVENTIONS:  - Educate patient/family on patient safety including physical limitations  - Instruct patient to call for assistance with activity   - Consult OT/PT to assist with strengthening/mobility   - Keep Call bell within reach  - Keep bed low and locked with side rails adjusted as appropriate  - Keep care items and personal belongings within reach  - Initiate and maintain comfort rounds  - Make Fall Risk Sign visible to staff  - Offer Toileting every  Hours,  in advance of need  - Initiate/Maintain alarm  - Obtain necessary fall risk management equipment:   - Apply yellow socks and bracelet for high fall risk patients  - Consider moving patient to room near nurses station  Outcome: Progressing  Goal: Maintain or return to baseline ADL function  Description: INTERVENTIONS:  -  Assess patient's ability to carry out ADLs; assess patient's baseline for ADL function and identify physical deficits which impact ability to perform ADLs (bathing, care of mouth/teeth, toileting, grooming, dressing, etc.)  - Assess/evaluate cause of self-care deficits   - Assess range of motion  - Assess patient's mobility; develop plan if impaired  - Assess patient's need for assistive devices and provide as appropriate  - Encourage maximum independence but intervene and supervise when necessary  - Involve family in performance of ADLs  - Assess for home care needs following discharge   - Consider OT consult to assist with ADL evaluation and planning for discharge  - Provide patient education as appropriate  Outcome: Progressing  Goal: Maintains/Returns to pre admission functional level  Description: INTERVENTIONS:  - Perform AM-PAC 6 Click Basic Mobility/ Daily Activity assessment daily.  - Set and communicate daily mobility goal to care team and patient/family/caregiver.   - Collaborate with rehabilitation services on mobility goals if consulted  - Perform Range of Motion  times a day.  - Reposition patient every  hours.  - Dangle patient  times a day  - Stand patient  times a day  - Ambulate patient  times a day  - Out of bed to chair  times a day   - Out of bed for meals times a day  - Out of bed for toileting  - Record patient progress and toleration of activity level   Outcome: Progressing     Problem: Knowledge Deficit  Goal: Patient/family/caregiver demonstrates understanding of disease process, treatment plan, medications, and discharge instructions  Description: Complete learning  assessment and assess knowledge base.  Interventions:  - Provide teaching at level of understanding  - Provide teaching via preferred learning methods  Outcome: Progressing     Problem: DISCHARGE PLANNING  Goal: Discharge to home or other facility with appropriate resources  Description: INTERVENTIONS:  - Identify barriers to discharge w/patient and caregiver  - Arrange for needed discharge resources and transportation as appropriate  - Identify discharge learning needs (meds, wound care, etc.)  - Arrange for interpretive services to assist at discharge as needed  - Refer to Case Management Department for coordinating discharge planning if the patient needs post-hospital services based on physician/advanced practitioner order or complex needs related to functional status, cognitive ability, or social support system  Outcome: Progressing     Problem: POSTPARTUM  Goal: Experiences normal postpartum course  Description: INTERVENTIONS:  - Monitor maternal vital signs  - Assess uterine involution and lochia  Outcome: Progressing  Goal: Appropriate maternal -  bonding  Description: INTERVENTIONS:  - Identify family support  - Assess for appropriate maternal/infant bonding   -Encourage maternal/infant bonding opportunities  - Referral to  or  as needed  Outcome: Progressing  Goal: Establishment of infant feeding pattern  Description: INTERVENTIONS:  - Assess breast/bottle feeding  - Refer to lactation as needed  Outcome: Progressing  Goal: Incision(s), wounds(s) or drain site(s) healing without S/S of infection  Description: INTERVENTIONS  - Assess and document dressing, incision, wound bed, drain sites and surrounding tissue  - Provide patient and family education  - Perform skin care/dressing changes every   Outcome: Progressing

## 2024-11-29 ENCOUNTER — TRANSITIONAL CARE MANAGEMENT (OUTPATIENT)
Dept: FAMILY MEDICINE CLINIC | Facility: CLINIC | Age: 29
End: 2024-11-29

## 2024-11-29 VITALS
HEIGHT: 62 IN | BODY MASS INDEX: 26.31 KG/M2 | HEART RATE: 105 BPM | SYSTOLIC BLOOD PRESSURE: 116 MMHG | TEMPERATURE: 98 F | OXYGEN SATURATION: 99 % | RESPIRATION RATE: 20 BRPM | DIASTOLIC BLOOD PRESSURE: 74 MMHG | WEIGHT: 143 LBS

## 2024-11-29 PROCEDURE — 99024 POSTOP FOLLOW-UP VISIT: CPT | Performed by: STUDENT IN AN ORGANIZED HEALTH CARE EDUCATION/TRAINING PROGRAM

## 2024-11-29 RX ORDER — OXYCODONE HYDROCHLORIDE 10 MG/1
10 TABLET ORAL EVERY 4 HOURS PRN
Qty: 5 TABLET | Refills: 0 | Status: CANCELLED | OUTPATIENT
Start: 2024-11-29 | End: 2024-12-09

## 2024-11-29 RX ORDER — BENZOCAINE/MENTHOL 6 MG-10 MG
1 LOZENGE MUCOUS MEMBRANE DAILY PRN
Start: 2024-11-29

## 2024-11-29 RX ORDER — IBUPROFEN 600 MG/1
600 TABLET, FILM COATED ORAL EVERY 6 HOURS
Qty: 30 TABLET | Refills: 0 | Status: SHIPPED | OUTPATIENT
Start: 2024-11-29

## 2024-11-29 RX ORDER — OXYCODONE HYDROCHLORIDE 5 MG/1
5 TABLET ORAL EVERY 4 HOURS PRN
Qty: 5 TABLET | Refills: 0 | Status: CANCELLED | OUTPATIENT
Start: 2024-11-29 | End: 2024-12-09

## 2024-11-29 RX ORDER — ACETAMINOPHEN 325 MG/1
650 TABLET ORAL EVERY 6 HOURS SCHEDULED
Qty: 6 TABLET | Refills: 0 | Status: SHIPPED | OUTPATIENT
Start: 2024-11-29 | End: 2024-11-30

## 2024-11-29 RX ADMIN — ACETAMINOPHEN 650 MG: 325 TABLET, FILM COATED ORAL at 10:14

## 2024-11-29 RX ADMIN — Medication 1 TABLET: at 10:13

## 2024-11-29 RX ADMIN — ACETAMINOPHEN 650 MG: 325 TABLET, FILM COATED ORAL at 03:10

## 2024-11-29 RX ADMIN — IBUPROFEN 600 MG: 600 TABLET ORAL at 00:12

## 2024-11-29 RX ADMIN — IBUPROFEN 600 MG: 600 TABLET ORAL at 06:21

## 2024-11-29 RX ADMIN — SENNOSIDES 8.6 MG: 8.6 TABLET, FILM COATED ORAL at 10:13

## 2024-11-29 NOTE — PLAN OF CARE
Problem: PAIN - ADULT  Goal: Verbalizes/displays adequate comfort level or baseline comfort level  Description: Interventions:  - Encourage patient to monitor pain and request assistance  - Assess pain using appropriate pain scale  - Administer analgesics based on type and severity of pain and evaluate response  - Implement non-pharmacological measures as appropriate and evaluate response  - Consider cultural and social influences on pain and pain management  - Notify physician/advanced practitioner if interventions unsuccessful or patient reports new pain  Outcome: Adequate for Discharge     Problem: INFECTION - ADULT  Goal: Absence or prevention of progression during hospitalization  Description: INTERVENTIONS:  - Assess and monitor for signs and symptoms of infection  - Monitor lab/diagnostic results  - Monitor all insertion sites, i.e. indwelling lines, tubes, and drains  - Monitor endotracheal if appropriate and nasal secretions for changes in amount and color  - [place] appropriate cooling/warming therapies per order  - Administer medications as ordered  - Instruct and encourage patient and family to use good hand hygiene technique  - Identify and instruct in appropriate isolation precautions for identified infection/condition  Outcome: Adequate for Discharge  Goal: Absence of fever/infection during neutropenic period  Description: INTERVENTIONS:  - Monitor WBC    Outcome: Adequate for Discharge     Problem: SAFETY ADULT  Goal: Patient will remain free of falls  Description: INTERVENTIONS:  - Educate patient/family on patient safety including physical limitations  - Instruct patient to call for assistance with activity   - Consult OT/PT to assist with strengthening/mobility   - Keep Call bell within reach  - Keep bed low and locked with side rails adjusted as appropriate  - Keep care items and personal belongings within reach  - Initiate and maintain comfort rounds    Outcome: Adequate for Discharge  Goal:  Maintain or return to baseline ADL function  Description: INTERVENTIONS:  -  Assess patient's ability to carry out ADLs; assess patient's baseline for ADL function and identify physical deficits which impact ability to perform ADLs (bathing, care of mouth/teeth, toileting, grooming, dressing, etc.)  - Assess/evaluate cause of self-care deficits   - Assess range of motion  - Assess patient's mobility; develop plan if impaired  - Assess patient's need for assistive devices and provide as appropriate  - Encourage maximum independence but intervene and supervise when necessary  - Involve family in performance of ADLs  - Assess for home care needs following discharge   - Consider OT consult to assist with ADL evaluation and planning for discharge  - Provide patient education as appropriate  Outcome: Adequate for Discharge  Goal: Maintains/Returns to pre admission functional level  Description: INTERVENTIONS:  - Perform AM-PAC 6 Click Basic Mobility/ Daily Activity assessment daily.  - Set and communicate daily mobility goal to care team and patient/family/caregiver.     - Out of bed for toileting  - Record patient progress and toleration of activity level   Outcome: Adequate for Discharge     Problem: Knowledge Deficit  Goal: Patient/family/caregiver demonstrates understanding of disease process, treatment plan, medications, and discharge instructions  Description: Complete learning assessment and assess knowledge base.  Interventions:  - Provide teaching at level of understanding  - Provide teaching via preferred learning methods  Outcome: Adequate for Discharge     Problem: DISCHARGE PLANNING  Goal: Discharge to home or other facility with appropriate resources  Description: INTERVENTIONS:  - Identify barriers to discharge w/patient and caregiver  - Arrange for needed discharge resources and transportation as appropriate  - Identify discharge learning needs (meds, wound care, etc.)  - Arrange for interpretive services  to assist at discharge as needed  - Refer to Case Management Department for coordinating discharge planning if the patient needs post-hospital services based on physician/advanced practitioner order or complex needs related to functional status, cognitive ability, or social support system  Outcome: Adequate for Discharge     Problem: POSTPARTUM  Goal: Experiences normal postpartum course  Description: INTERVENTIONS:  - Monitor maternal vital signs  - Assess uterine involution and lochia  Outcome: Adequate for Discharge  Goal: Appropriate maternal -  bonding  Description: INTERVENTIONS:  - Identify family support  - Assess for appropriate maternal/infant bonding   -Encourage maternal/infant bonding opportunities  - Referral to  or  as needed  Outcome: Adequate for Discharge  Goal: Establishment of infant feeding pattern  Description: INTERVENTIONS:  - Assess breast/bottle feeding  - Refer to lactation as needed  Outcome: Adequate for Discharge  Goal: Incision(s), wounds(s) or drain site(s) healing without S/S of infection  Description: INTERVENTIONS  - Assess and document dressing, incision, wound bed, drain sites and surrounding tissue  - Provide patient and family education  - Perform skin care/dressing changes every day  Outcome: Adequate for Discharge

## 2024-11-29 NOTE — PLAN OF CARE
Problem: PAIN - ADULT  Goal: Verbalizes/displays adequate comfort level or baseline comfort level  Description: Interventions:  - Encourage patient to monitor pain and request assistance  - Assess pain using appropriate pain scale  - Administer analgesics based on type and severity of pain and evaluate response  - Implement non-pharmacological measures as appropriate and evaluate response  - Consider cultural and social influences on pain and pain management  - Notify physician/advanced practitioner if interventions unsuccessful or patient reports new pain  Outcome: Progressing     Problem: INFECTION - ADULT  Goal: Absence or prevention of progression during hospitalization  Description: INTERVENTIONS:  - Assess and monitor for signs and symptoms of infection  - Monitor lab/diagnostic results  - Monitor all insertion sites, i.e. indwelling lines, tubes, and drains  - Monitor endotracheal if appropriate and nasal secretions for changes in amount and color  - Exchange appropriate cooling/warming therapies per order  - Administer medications as ordered  - Instruct and encourage patient and family to use good hand hygiene technique  - Identify and instruct in appropriate isolation precautions for identified infection/condition  Outcome: Progressing  Goal: Absence of fever/infection during neutropenic period  Description: INTERVENTIONS:  - Monitor WBC    Outcome: Progressing     Problem: SAFETY ADULT  Goal: Patient will remain free of falls  Description: INTERVENTIONS:  - Educate patient/family on patient safety including physical limitations  - Instruct patient to call for assistance with activity   - Consult OT/PT to assist with strengthening/mobility   - Keep Call bell within reach  - Keep bed low and locked with side rails adjusted as appropriate  - Keep care items and personal belongings within reach  - Initiate and maintain comfort rounds  - Make Fall Risk Sign visible to staff  - Apply yellow socks and bracelet  for high fall risk patients  - Consider moving patient to room near nurses station  Outcome: Progressing  Goal: Maintain or return to baseline ADL function  Description: INTERVENTIONS:  -  Assess patient's ability to carry out ADLs; assess patient's baseline for ADL function and identify physical deficits which impact ability to perform ADLs (bathing, care of mouth/teeth, toileting, grooming, dressing, etc.)  - Assess/evaluate cause of self-care deficits   - Assess range of motion  - Assess patient's mobility; develop plan if impaired  - Assess patient's need for assistive devices and provide as appropriate  - Encourage maximum independence but intervene and supervise when necessary  - Involve family in performance of ADLs  - Assess for home care needs following discharge   - Consider OT consult to assist with ADL evaluation and planning for discharge  - Provide patient education as appropriate  Outcome: Progressing  Goal: Maintains/Returns to pre admission functional level  Description: INTERVENTIONS:  - Perform AM-PAC 6 Click Basic Mobility/ Daily Activity assessment daily.  - Set and communicate daily mobility goal to care team and patient/family/caregiver.   - Collaborate with rehabilitation services on mobility goals if consulted  - Out of bed for toileting  - Record patient progress and toleration of activity level   Outcome: Progressing     Problem: Knowledge Deficit  Goal: Patient/family/caregiver demonstrates understanding of disease process, treatment plan, medications, and discharge instructions  Description: Complete learning assessment and assess knowledge base.  Interventions:  - Provide teaching at level of understanding  - Provide teaching via preferred learning methods  Outcome: Progressing     Problem: DISCHARGE PLANNING  Goal: Discharge to home or other facility with appropriate resources  Description: INTERVENTIONS:  - Identify barriers to discharge w/patient and caregiver  - Arrange for needed  discharge resources and transportation as appropriate  - Identify discharge learning needs (meds, wound care, etc.)  - Arrange for interpretive services to assist at discharge as needed  - Refer to Case Management Department for coordinating discharge planning if the patient needs post-hospital services based on physician/advanced practitioner order or complex needs related to functional status, cognitive ability, or social support system  Outcome: Progressing     Problem: POSTPARTUM  Goal: Experiences normal postpartum course  Description: INTERVENTIONS:  - Monitor maternal vital signs  - Assess uterine involution and lochia  Outcome: Progressing  Goal: Appropriate maternal -  bonding  Description: INTERVENTIONS:  - Identify family support  - Assess for appropriate maternal/infant bonding   -Encourage maternal/infant bonding opportunities  - Referral to  or  as needed  Outcome: Progressing  Goal: Establishment of infant feeding pattern  Description: INTERVENTIONS:  - Assess breast/bottle feeding  - Refer to lactation as needed  Outcome: Progressing  Goal: Incision(s), wounds(s) or drain site(s) healing without S/S of infection  Description: INTERVENTIONS  - Assess and document dressing, incision, wound bed, drain sites and surrounding tissue  - Provide patient and family education  Outcome: Progressing

## 2024-11-29 NOTE — PROGRESS NOTES
"Progress Note - OB/GYN  Ellyn Santana 28 y.o. female MRN: [id number]  Unit/Bed#: -01 Encounter: [id number]    Assessment and Plan   Ellyn Santana is a patient of: [facility] . She is POD# 3 s/p primary  section, low transverse incision. Recovering well and stable.    * Status post primary low transverse  section  Assessment & Plan  QBL: 769cc; admission Hb 13.9g/dL -> 9.6g/dL s/p venofer. Patient progressing toward postoperative milestones.  - Continue routine postoperative care  - Pain management with oral analgesics  - DVT Ppx: SCDs; encourage ambulation  - Void trial passed  - Encourage breastfeeding, familial bonding      Disposition   - Anticipate discharge home on POD# 3    Subjective/Objective   Chief Concern: POD#3 s/p primary  section, low transverse incision  Subjective:    Ellyn Santana reports feeling tired but no current concerns. Pain is well controlled. She is voiding. She is passing flatus. She is ambulating. She is tolerating PO and denies nausea or vomitting. She denies chest pain, shortness of breath, lightheadedness. Lochia is normal. She is breastfeeding.    Vitals:   /75 (BP Location: Left arm)   Pulse 75   Temp 98 °F (36.7 °C) (Oral)   Resp 18   Ht 5' 2\" (1.575 m)   Wt 64.9 kg (143 lb)   LMP 2024 (Exact Date)   SpO2 99%   Breastfeeding Yes   BMI 26.16 kg/m²     No intake or output data in the 24 hours ending 24 0559    Invasive Devices       None                 Physical Exam:   GEN: well-appearing, alert and oriented x3  RESP: nonlabored respirations on room air  ABDOMEN: soft, nontender, nodistended, fundus firm below the umbilicus, incision clean/dry/intact      Labs:   Hemoglobin   Date Value Ref Range Status   2024 9.6 (L) 11.5 - 15.4 g/dL Final     Comment:     Results verified by repeat   2024 13.9 11.5 - 15.4 g/dL Final     WBC   Date Value Ref Range Status   2024 19.87 (H) 4.31 - 10.16 Thousand/uL Final     " Comment:     This is an appended report.  These results have been appended to a previously preliminary verified report.   11/26/2024 8.14 4.31 - 10.16 Thousand/uL Final     Platelets   Date Value Ref Range Status   11/27/2024 178 149 - 390 Thousands/uL Final     Comment:     This is an appended report.  These results have been appended to a previously preliminary verified report.   11/26/2024 216 149 - 390 Thousands/uL Final     Creatinine   Date Value Ref Range Status   12/15/2023 0.74 0.60 - 1.30 mg/dL Final     Comment:     Standardized to IDMS reference method          Rosalia Funes MD   11/29/24   5:59 AM

## 2024-12-01 LAB — PLACENTA IN STORAGE: NORMAL

## 2024-12-02 ENCOUNTER — TELEPHONE (OUTPATIENT)
Dept: OBGYN CLINIC | Facility: CLINIC | Age: 29
End: 2024-12-02

## 2024-12-02 NOTE — TELEPHONE ENCOUNTER
POSTPARTUM PHONE CALL ASSESSMENT    Date of Delivery: 24  Delivering Provider: Dr. Rick  Mode:      Delivery Notes/Complications: none noted   Do you still have bleeding/pain? If so, how much/how severe? VB resolved none present per pt, denies pain. Discomfort at incision site. Continuing alternating tylenol and motrin which has been helpful and effective. Denies any erythema, edema, fevers/chills or drainage from site.   Regular BMs/Urination? yes  Breastfeeding/Formula/Both? breastfeeding  How are you doing emotionally? Feels okay, coming home was emotional but overall doing well feels like evened out now per patient. Adjusting at home.  Do you have any other questions or concerns for us or your provider? None at this time  Have you scheduled the pediatrician appointment with pediatrician? yes  Do you have a postpartum visit scheduled? 24

## 2024-12-03 NOTE — UTILIZATION REVIEW
"NOTIFICATION OF INPATIENT ADMISSION   MATERNITY/DELIVERY AUTHORIZATION REQUEST   SERVICING FACILITY:   [facility]  Parent Child [facility] - L&D, [place], NICU  [address] [place] [address]  Tax ID: [id number]  NPI: [id number]   ATTENDING PROVIDER:  Attending Name and NPI#: Merle Rick Md [[id number]]  Address: [address] [place] [address] [place] [address]  Phone: [phone]   ADMISSION INFORMATION:  Place of Service: Inpatient [facility] ([place], [place])  Place of Service Code: 21  Inpatient Admission Date/Time: 24 11:13 AM  Discharge Date/Time: 2024  2:04 PM  Admitting Diagnosis Code/Description:  Breech presentation, single or unspecified fetus [O32.1XX0]  Encounter for  delivery without indication [O82]     Mother: Ellyn Santana 1995 Estimated Date of Delivery: 24  Delivering clinician: Merle Rick   OB History          3    Para   1    Term   1            AB   2    Living   1         SAB   2    IAB   0    Ectopic   0    Multiple   0    Live Births   1               [place] Name & MRN:   Information for the patient's :  Adonay Santana [[id number]]    Delivery Information:  Sex: male  Delivered 2024 3:06 PM by , Low Transverse; Gestational Age: 39w1d    [place] Measurements:  Weight: 6 lb 14.4 oz (3130 g);  Height: 19\"    APGAR 1 minute 5 minutes 10 minutes   Totals: 8 9       UTILIZATION REVIEW CONTACT:  Josy Jara, Utilization   Network Utilization Review Department  Phone: [phone]  Fax [phone]  Email: [email or web address]  Contact for approvals/pending authorizations, clinical reviews, and discharge.     PHYSICIAN ADVISORY SERVICES:  Medical Necessity Denial & Rimb-sb-Przd Review  Phone: [phone]  Fax: [phone]  Email: [email or web address]     DISCHARGE SUPPORT TEAM:  For Patients Discharge Needs & Updates  Phone: [phone] opt. 2 Fax: " [phone]  Email: [email or web address]        NOTIFICATION OF ADMISSION DISCHARGE   This is a Notification of Discharge from [facility]. Please be advised that this patient has been discharge from our facility. Below you will find the admission and discharge date and time including the patient’s disposition.   UTILIZATION REVIEW CONTACT:  Josy Jara  Utilization   Network Utilization Review Department  Phone: [phone] x carefully listen to the prompts. All voicemails are confidential.  Email: [email or web address]     ADMISSION INFORMATION  PRESENTATION DATE: 11/26/2024 11:13 AM  OBERVATION ADMISSION DATE: N/A  INPATIENT ADMISSION DATE: 11/26/24 11:13 AM   DISCHARGE DATE: 11/29/2024  2:04 PM   DISPOSITION:Home/Self Care    Network Utilization Review Department  ATTENTION: Please call with any questions or concerns to [phone] and carefully listen to the prompts so that you are directed to the right person. All voicemails are confidential.   For Discharge needs, contact Care Management DC Support Team at [phone] opt. 2  Send all requests for admission clinical reviews, approved or denied determinations and any other requests to dedicated fax number below belonging to the campus where the patient is receiving treatment. List of dedicated fax numbers for the Facilities:  FACILITY NAME UR FAX NUMBER   ADMISSION DENIALS (Administrative/Medical Necessity) [phone]   DISCHARGE SUPPORT TEAM ([facility]) [phone]   PARENT CHILD HEALTH (Maternity/NICU/Pediatrics) [phone]   [facility] [phone]   [facility] [phone]   [facility] [phone]   [facility] [phone]   [facility] [phone]   [facility] [phone]   [facility] [phone]    [facility] [phone]   [facility] [phone]   [facility] [phone]   [facility] [phone]   [facility] [phone]

## 2024-12-09 ENCOUNTER — OFFICE VISIT (OUTPATIENT)
Dept: OBGYN CLINIC | Facility: CLINIC | Age: 29
End: 2024-12-09

## 2024-12-09 VITALS
DIASTOLIC BLOOD PRESSURE: 72 MMHG | WEIGHT: 129 LBS | BODY MASS INDEX: 23.74 KG/M2 | SYSTOLIC BLOOD PRESSURE: 112 MMHG | HEIGHT: 62 IN

## 2024-12-09 DIAGNOSIS — Z98.891 STATUS POST PRIMARY LOW TRANSVERSE CESAREAN SECTION: Primary | Chronic | ICD-10-CM

## 2024-12-09 PROCEDURE — 99024 POSTOP FOLLOW-UP VISIT: CPT | Performed by: OBSTETRICS & GYNECOLOGY

## 2024-12-09 NOTE — PROGRESS NOTES
"Subjective     Ellyn Santana is a 28 y.o. G3, P1 female here for a postop visit.  Patient is 2 weeks post primary  for breech baby.  Patient is tolerating regular diet, voiding and bowels without difficulty.  Not requiring pain medication.  Baby is nursing well patient's milk is coming in and has been somewhat engorged but otherwise doing well.  Baby is still nursing frequently and has just regained his birthweight.  Planning to use condoms for contraception has discussed previously recommended interval.     Gynecologic History  Patient's last menstrual period was 2024 (exact date).  Contraception: condoms  Last Pap: 2023. Results were: normal      Obstetric History  OB History    Para Term  AB Living   3 1 1  2 1   SAB IAB Ectopic Multiple Live Births   2 0 0 0 1      # Outcome Date GA Lbr She[place]har/2nd Weight Sex Type Anes PTL Lv   3 Term 24 39w1d  3130 g (6 lb 14.4 oz) M CS-LTranv Spinal N MANUEL   2 SAB 2023 5w0d          1 SAB 23 6w0d                The following portions of the patient's history were reviewed and updated as appropriate: allergies, current medications, past family history, past medical history, past social history, past surgical history, and problem list.    Review of Systems  Review of Systems     Objective     /72 (BP Location: Left arm, Patient Position: Sitting, Cuff Size: Standard)   Ht 5' 2\" (1.575 m)   Wt 58.5 kg (129 lb)   LMP 2024 (Exact Date)   BMI 23.59 kg/m²   General appearance: alert and oriented, in no acute distress  Head: Normocephalic, without obvious abnormality, atraumatic  Lungs: clear to auscultation bilaterally  Heart: regular rate and rhythm, S1, S2 normal, no murmur, click, rub or gallop  Abdomen: soft, non-tender; bowel sounds normal; no masses,  no organomegaly and fundus firm nontender at U- 3, incision clean dry intact healing well reviewed massage to improve final healing  Extremities: extremities normal, warm " and well-perfused; no cyanosis, clubbing, or edema      Assessment  28-year-old G3, P1 status post  for breech baby steadily recovering.     Plan  Return in 2 weeks for postpartum visit or sooner as needed

## 2024-12-19 ENCOUNTER — OFFICE VISIT (OUTPATIENT)
Dept: POSTPARTUM | Facility: CLINIC | Age: 29
End: 2024-12-19
Payer: COMMERCIAL

## 2024-12-19 PROCEDURE — 99404 PREV MED CNSL INDIV APPRX 60: CPT | Performed by: PEDIATRICS

## 2024-12-19 NOTE — PATIENT INSTRUCTIONS
"Continue breastfeeding as often as you desire.  Pump when a feeding at the breast is missed, as needed for comfort and as needed to obtain milk for bottle feeding.  When pumping, cycle your pump through stimulation and expression mode several times in a session to stimulate several let downs until you have expressed enough milk to feed the baby and to achieve breast comfort.  There is no need to \"empty\" the breast completely. Use gentle hands on pumping and hand expression   Maintain your pump as recommended. Use flange that fits comfortably and allows the breast to empty effectively.    Follow up with Dr Kay as scheduled.  Please call with any questions or concerns.  "

## 2024-12-19 NOTE — PROGRESS NOTES
"INITIAL BREAST FEEDING EVALUATION    Informant/Relationship: Ellyn and Eligio    Discussion of General Lactation Issues: Ellyn feels that Adonay is breastfeeding well.  His peds feels his weight gain is reassuring.  Ellyn has been concerned that he spits up \"a lot\".  More than she feels is \"normal\".  He spits up right after feeding and up to an hour later.  The spitting up does not seem painful for Adonay.  He is still eager to feed and is growing.     Infant is 3 weeks old today.        History:  Fertility Problem: Had two early losses prior to conceiving this pregnancy.  Breast changes: Slight increase in the fullness of the breasts.  Areolas were larger and darker  :  No. Scheduled  due to breech presentation  Full term:yes - 39 1/7 weeks   labor:no  First nursing/attempt < 1 hour after birth: First attempt was 1-2 hours after delivery in the RR.  Baby was positioned at the breast by hospital staff  Skin to skin following delivery:yes - STS first with Dad and then with Mom in the RR when she felt up to it.  Breast changes after delivery:yes - breasts are velazquez.  Milk was in by the end of the first week.  Rooming in (infant in room with mother with exception of procedures, eg. Circumcision:  yes  Blood sugar issues:no  NICU stay:no  Jaundice:no  Phototherapy:no  Supplement given: (list supplement and method used as well as reason(s):no    Past Medical History:   Diagnosis Date    Miscarriage 23, 23    Spontaneous    Varicella     as a child         Current Outpatient Medications:     benzocaine-menthol-lanolin-aloe (DERMOPLAST) 20-0.5 % topical spray, Apply 1 Application topically every 6 (six) hours as needed for irritation (Patient not taking: Reported on 2024), Disp: , Rfl:     hydrocortisone 1 % cream, Apply 1 Application topically daily as needed for irritation (Patient not taking: Reported on 2024), Disp: , Rfl:     ibuprofen (MOTRIN) 600 mg tablet, Take 1 " tablet (600 mg total) by mouth every 6 (six) hours (Patient not taking: Reported on 2024), Disp: 30 tablet, Rfl: 0    Prenatal Multivit-Min-Fe-FA (PRE-GRAYSON PO), Take by mouth, Disp: , Rfl:     witch hazel-glycerin (TUCKS) topical pad, Apply 1 Pad topically every 4 (four) hours as needed for irritation (Patient not taking: Reported on 2024), Disp: , Rfl:     No Known Allergies    Social History     Substance and Sexual Activity   Drug Use Never       Social History Never a smoker    Interval Breastfeeding History:  Frequency of breast feeding: Every 2-3.5 hours on demand.  Does mother feel breastfeeding is effective: Yes  Does infant appear satisfied after nursing:Yes, typically content after feeding on one breast  Stooling pattern normal: Yes  Urinating frequently:Yes  Using shield or shells: No    Alternative/Artificial Feedings:   Bottle: No  Cup: No  Syringe/Finger: No           Formula Type: none                     Amount: n/a            Breast Milk:                      Amount: whatever is transferred while feeding at the breast.            Frequency Q 2 Hr between feedings  Elimination Problems: No      Equipment:  Nipple Shield             Type: none             Size: n/a             Frequency of Use: n/a  Pump            Type: Spectra S1 and Haakaa Crista            Frequency of Use: none  Shells            Type: none            Frequency of use: n/a    Equipment Problems: no    Mom:  Breast: Medium sized symmetrical breasts.  Rounded shape.  Closely spaced.  Firm and full.  Nipple Assessment in General: Normal: elongated/eraser, no discoloration and no damage noted.  Mother's Awareness of Feeding Cues                 Recognizes: Yes                  Verbalizes: Yes  Support System: FOB, extended family  History of Breastfeeding: none  Changes/Stressors/Violence: Ellyn is concerned that Adonay spits up too much  Concerns/Goals: Ellyn plans to EBF.  She would like reassurance that Adonay is  healthy and doing well    Problems with Mom: none    Physical Exam  Constitutional:       Appearance: Normal appearance.   HENT:      Head: Normocephalic and atraumatic.      Nose: Nose normal.   Pulmonary:      Effort: Pulmonary effort is normal.   Musculoskeletal:         General: Normal range of motion.      Cervical back: Normal range of motion and neck supple.   Neurological:      Mental Status: She is alert and oriented to person, place, and time.   Skin:     General: Skin is warm and dry.   Psychiatric:         Mood and Affect: Mood normal.         Behavior: Behavior normal.         Thought Content: Thought content normal.         Judgment: Judgment normal.         Infant:  Behaviors: Alert  Color: Normal  Birth weight: 3130 grams  Current weight: 3550 grams    Problems with infant: spits up a lot      General Appearance:  Alert, active, no distress                             Head:  Normocephalic, AFOF, sutures opposed                             Eyes:  Conjunctiva clear, no drainage                              Ears:  Normally placed, no anomolies                             Nose:  No drainage or erythema                           Mouth:  No lesions. Slightly recessed chin.  Slightly narrow gape.  The tongue extends to the lower lip but not beyond.  It tips on it's side when lateralizing. The tongue lifts to the palate when he cries. Poor cupping and no peristalsis was felt initially as he sucked on my finger.  The tongue retracted with every suck and he bit down on my finger. Over several minutes, cupping improved somewhat and some peristalsis was felt but the anterior portion of the tongue lost contact with my finger with gentle pressure on his chin. The tongue continued to retract with every suck. The lingual frenulum is thin, slightly short, attached posterior to the tip of the tongue (about 2/3 of the tongue is connected to the mandible by the frenulum) and there is a webbed attachment on the body of  the lower alveolar ridge.                    Neck:  Supple, symmetrical, trachea midline                 Respiratory:  No grunting, flaring, retractions, breath sounds clear and equal            Cardiovascular:  Regular rate and rhythm. No murmur. Adequate perfusion/capillary refill. Femoral pulse present                    Abdomen:   Soft, non-tender, no masses, bowel sounds present, no HSM             Genitourinary:  Normal male, testes descended, no discharge, swelling, or pain, anus patent                          Spine:   No abnormalities noted        Musculoskeletal:  Full range of motion          Skin/Hair/Nails:   Skin warm, dry, and intact, no rashes or abnormal dyspigmentation or lesions                Neurologic:   No abnormal movement, tone appropriate for gestational age     Latch:  Efficiency:               Lips Flanged: Yes              Depth of latch: good, some dimpling of he cheeks as he sucked.              Audible Swallow: Yes, but no sustained SSB.  Short bursts, frequent breaks.  Quickly he began to struggle once flow slowed.  He tugged on the nipple and and appeared unsettled.  Breast compressions helped a little.                Visible Milk: Yes              Wide Open/ Asymmetrical: Yes              Suck Swallow Cycle: Breathing: unlabored, Coordinated: yes  Nipple Assessment after latch: Normal: elongated/eraser, no discoloration and no damage noted.  Latch Problems: Adonay was able to latch without difficulty but he struggled to transfer milk fairly quickly once flow slowed.  Suckling bursts were short.  Jaw movement was minimal. This feeding was less than 10 minutes.    Position:  Infant's Ergonomics/Body               Body Alignment: Yes               Head Supported: Yes               Close to Mom's body/ Lifted/ Supported: Yes               Mom's Ergonomics/Body: Yes                           Supported: Yes                           Sitting Back: Yes                            Brings Baby to her breast: Yes  Positioning Problems: Ellyn positioned Adonay effectively in cross cradle hold.  She just needed a little help compressing her breast in alignment with his mouth.      Handouts:   None    Education:  Reviewed Latch: Demonstrated how to gently compress the breast and align the baby so that his nose is just above the nipple with his lower lip and chin touching the breast to encourage the deepest, widest, off-center latch. Discussed that Adonay is able to latch fairly well but appears to struggle to transfer milk while feeding at the breast.  Reviewed Positioning for Dyad: demonstrated how to position baby belly to belly with mom  Reviewed Frequency/Supply & Demand: discussed how milk production is regulated  Reviewed Infant:Cues and varied States of Awareness  Reviewed Alternative/Artificial Feedings: discussed and demonstrated paced bottle feeding  Reviewed Mom/Breast care: Discussed appropriate handling of the breasts to avoid pain, inflammation and trauma.   Reviewed Equipment: discussed the use and features of the Spectra S1 and how to determine what size flange to use.      Plan:    Reassurance and support given.  I acknowledged Ellyn's concerns and her commitment to breastfeeding.  I encouraged her to continue breastfeeding as often as she desires.  I reviewed how to use her pump as she plans to begin pumping to provide some milk for bottle feeding.  An appointment was scheduled with Dr Kay for more evaluation of Adonay's feeding challenges.  An appointment was scheduled with me for ongoing support as well.  I encouraged Ellyn to call with any questions or concerns.    I have spent 90 minutes with Patient and family today in which greater than 50% of this time was spent in counseling/coordination of care regarding Patient and family education and Counseling / Coordination of care.

## 2024-12-22 NOTE — PROGRESS NOTES
I have reviewed the notes, assessments, and/or procedures performed by Kimmy Lackey RN, IBCLC, I concur with her/his documentation of Ellyn Sarabia MD 12/22/24

## 2024-12-31 ENCOUNTER — OFFICE VISIT (OUTPATIENT)
Age: 29
End: 2024-12-31
Payer: COMMERCIAL

## 2024-12-31 VITALS — SYSTOLIC BLOOD PRESSURE: 96 MMHG | DIASTOLIC BLOOD PRESSURE: 58 MMHG

## 2024-12-31 PROCEDURE — 99215 OFFICE O/P EST HI 40 MIN: CPT | Performed by: STUDENT IN AN ORGANIZED HEALTH CARE EDUCATION/TRAINING PROGRAM

## 2024-12-31 NOTE — PROGRESS NOTES
BREAST FEEDING FOLLOW UP VISIT    Informant/Relationship: mother and father     Discussion of General Lactation Issues: breastfeeding seems to be going well overall, mom here for follow up after last lactation visit with concerns for restricted tongue movement, he is gaining weight well, jessie is not having any pain or issues with milk production, taking mostly the breast, might get one or two bottles per day     Infant is 5 weeks old today.    Interval Breastfeeding History:  Frequency of breast feedin-3 hours, stays about 15 minutes, usually is satisfied after the one breast   Does mother feel breastfeeding is effective: Yes  Does infant appear satisfied after nursing:Yes  Stooling pattern normal:Yes  Urinating frequently:Yes  Using shield or shells:No    Alternative/Artificial Feedings:   Bottle: Spectra and nuk, paced bottle feeding, no issues with bottle  Cup: No  Syringe/Finger: No           Formula Type: n/a                     Amount: n/a            Breast Milk:                      Amount: 3.5 oz             Frequency Q 2-3 Hr between feedings  Elimination Problems: No    Equipment:  Pump            Type: spectra             Frequency of Use: once a day, getting 3.5-4 oz per breast     Equipment Problems: no    Mom:  Breast: medium sized, rounded, full   Nipple Assessment in General: Normal: elongated/eraser, no discoloration and no damage noted.  Mother's Awareness of Feeding Cues                 Recognizes: Yes                  Verbalizes: Yes  Support System: FOB   History of Breastfeeding: none   Changes/Stressors/Violence: none   Concerns/Goals: would like to get evaluated for concern based on last visit that he might have a tongue tie     Problems with Mom: none     Physical Exam  Constitutional:       General: She is not in acute distress.  Cardiovascular:      Rate and Rhythm: Normal rate and regular rhythm.   Pulmonary:      Effort: Pulmonary effort is normal.      Breath sounds: Normal breath  sounds.   Neurological:      General: No focal deficit present.      Mental Status: She is alert.   Psychiatric:         Mood and Affect: Mood normal.         Behavior: Behavior normal.       Infant:  Behaviors: Alert  Color: mild jaundice   Birth weight: 3130 g  Current weight: 3925 g    Problems with infant: restricted tongue movement     General Appearance:  Alert, active, no distress                             Head:  Normocephalic, AFOF, sutures opposed                             Eyes:  Conjunctiva clear, no drainage, mild scleral icterus                               Ears:  Normally placed, no anomolies                             Nose:  Septum intact, no drainage or erythema                           Mouth:  No lesions, tongue barely extends to lower alveolar ridge, whole body of tongue tips on side when lateralizing, tongue lifts up when crying, initially has hard time cupping finger but then is able to have some moderate cupping when sucking but uses gums to bite down initially, loses suction easily, some peristalsis is felt after a few sucks, frenulum attaches about 1/2 way down tongue and is thin but with moderate elasticity                     Neck:  Supple, symmetrical, trachea midline, no adenopathy; thyroid: no enlargement, symmetric, no tenderness/mass/nodules                 Respiratory:  No grunting, flaring, retractions, breath sounds clear and equal            Cardiovascular:  Regular rate and rhythm. No murmur. Adequate perfusion/capillary refill.                    Abdomen:   Soft, non-tender, no masses, bowel sounds present, no HSM        Musculoskeletal:  Full range of motion          Skin/Hair/Nails:   Skin warm, dry, and intact, no rashes or abnormal dyspigmentation or lesions                Neurologic:   No abnormal movement, tone appropriate for gestational age     Latch:  Efficiency:               Lips Flanged: Yes              Depth of latch: deep               Audible Swallow:  "Yes              Visible Milk: Yes              Wide Open/ Asymmetrical: Yes              Suck Swallow Cycle: Breathing: non labored, Coordinated: yes   Nipple Assessment after latch: Normal: elongated/eraser, no discoloration and no damage noted.  Latch Problems: Adonay is able to easily latch on to the breast, did reposition slightly and move him downwards somewhat and this improved the latch even more, initially had some small sucking bursts but then moved on to have more longer drawn out sucking, he occasionally has some mild dimpling of his cheeks when sucking but not the whole time and occasional clicking sounds, good jaw movement while feeding     Position:  Infant's Ergonomics/Body               Body Alignment: Yes, did readjust and move down somewhat                Head Supported: Yes               Close to Mom's body/ Lifted/ Supported: Yes               Mom's Ergonomics/Body: Yes                           Supported: Yes                           Sitting Back: Yes                           Brings Baby to her breast: Yes  Positioning Problems: see above     Education:  Reviewed Latch: Reviewed how to gently compress the breast as if offering a sandwich to facilitate a deeper latch.   Reviewed Positioning for Dyad: Reviewed how to bring baby to the breast so that his lower lip and chin touch the breast with his nose just above the nipple to encourage a wider, more asymmetric latch.  Reviewed Frequency/Supply & Demand: Nurse on demand: when baby gives hunger cues; when your breasts feel full, or at least every 3 hours during the day and every 5 hours at night counting from the beginning of one feeding to the beginning of the next; which ever comes first. When sucking and swallowing slow, gently compress the breast to restart flow. If active suck-swallow does not restart, gently remove the baby and offer the other breast; offering up to \"four\" breasts per feeding.  Reviewed Alternative/Artificial Feedings: " paced bottle feeding   Reviewed Equipment: pumping       Plan:    Continue feeding Adonay on demand.   He is showing good weight gain and your milk production is great.   Adonay does have signs of restricted tongue movement on exam but seems to be doing well with breastfeeding despite this. Discussed possible consequences of this with time.     The best science to support performing a frenotomy or tongue tie release has shown that the procedure improves direct breastfeeding. Putting the small incision into the tissue that anchors the tongue to the floor of the mouth and the lower jaw allows for the tongue and jaw to move better independently of each other allowing for a better and less painful latch.     There are theories that when the tongue moves better it can decrease how high or how arched the roof of the mouth is. If the baby has a recessed chin, it may be due to the tight frenulum attached to the lower jaw and releasing the frenulum may allow the jaw to grow better. This is all theory.    There are some stories of children who eat food better after a frenotomy. This is believed to occur because some children's tongues are limited in the side to side movement necessary to move food around the mouth. Once the tongue tie release is complete, the side to side movement of the tongue is more normal allowing for better movement of food to allow for chewing.    There are case reports and case series (groups of stories) about children who struggle with certain sounds in speech that are made by touching the tongue to the roof of the mouth. Some children struggle with making these sounds due to a tongue tie. If the tongue tie is the reason for this problem, a frenotomy may help improve speech. There are no studies to indicate that doing the frenotomy in the  period would prevent this problem, but there are no studies to suggest that it wouldn't either.    Ultimately, the tongue tie release procedure is a  medically beneficial, but not medically necessary procedure that can help babies latch to the breast and breastfeed. It may improve a baby's ability to take a bottle, may decrease gassiness or spitting, may improve the baby's ability to eat solids (especially table foods), may improve speech, and may help normalize the anatomy of the mouth and jaw. However, there is not much scientific evidence for most of these claims.    The procedure is quick and easy and typically considered very safe. It can be done at an office visit at [facility] and requires little in the way of after care.      They have appointment with ST this week as well which he could benefit from.     Please call office with any further questions or concerns.     I have spent 50 minutes with Patient and family today in which greater than 50% of this time was spent in counseling/coordination of care regarding Prognosis, Risks and benefits of tx options, Patient and family education, Impressions, Counseling / Coordination of care, Documenting in the medical record, Reviewing / ordering tests, medicine, procedures  , and Obtaining or reviewing history  .

## 2025-01-01 NOTE — PROGRESS NOTES
Postpartum Visit    25      Elizabeth Santana is a 29 y.o.  female who presents for a postpartum visit. She had  delivery on 24 @ 39w1d due to breech baby.   Complications included none.    She delivered a male .  Baby's course has been complicated with feeding problems. Has follow up tomorrow for feeding evaluation.    Baby is feeding by breast/bottle. [facility] used [facility] for [facility] support. Is also pumping    Lochia is no bleeding.   Bowel function is normal.   Bladder function is normal.   Chest pain: denies  Shortness of breath:denies  Leg pain: deneis  Patient has not been sexually active.   Desired contraception method is condoms    [place] score: 9    Gestational Diabetes: denies  Gestational HTN/Preeclampsia: denies  Pregnancy Complications: denies    The following portions of the patient's history were reviewed and updated as appropriate: allergies, current medications, past family history, past medical history, past social history, past surgical history, and problem list.      Current Outpatient Medications:     Prenatal Multivit-Min-Fe-FA (PRE-GRAYSON PO), Take by mouth, Disp: , Rfl:     No Known Allergies    Review of Systems  Constitutional: no fever, feels well  Breasts: no complaints of breast pain, breast lump, or nipple discharge  Gastrointestinal: no complaints nausea, vomiting  Genitourinary: as noted in HPI.  Neurological: no complaints of headache    Objective      /62 (BP Location: Left arm, Patient Position: Sitting, Cuff Size: Standard)   Wt 57.8 kg (127 lb 6.4 oz)   LMP 2024 (Exact Date)   Breastfeeding Yes   BMI 23.30 kg/m²   Physical Exam  Constitutional:       General: She is not in acute distress.     Appearance: Normal appearance. She is not ill-appearing.   Cardiovascular:      Rate and Rhythm: Normal rate and regular rhythm.      Pulses: Normal pulses.   Pulmonary:      Effort: Pulmonary effort is normal. No  respiratory distress.      Breath sounds: Normal breath sounds.   Chest:      Comments: Declined breast exam   Abdominal:      General: Abdomen is flat.      Tenderness: There is no abdominal tenderness.   Genitourinary:     Comments: Declined pelvic exam   Skin:     General: Skin is warm and dry.      Capillary Refill: Capillary refill takes less than 2 seconds.      Comments: Incision to lower abdomen is approximated and well healed. No drainage, swelling or erythema.    Neurological:      Mental Status: She is alert and oriented to person, place, and time.   Psychiatric:         Mood and Affect: Mood normal.         Behavior: Behavior normal.         Thought Content: Thought content normal.         Judgment: Judgment normal.           Assessment/Plan:  Postpartum Care and examination after  section  Birth control counseling     Ellyn Santana is a 29 y.o. who is postpartum from a C/S with a normal postpartum examination.      1. Contraception:  condoms   2. Annual exam due in 2025: Last Pap : 10/23 NILM   3. Lactation consult, [facility] information discussed.   4. Increase activity as tolerated, may resume exercise at 6 weeks post partum and when feeling fully healed.   5. Anticipated return to work: 6 - 8 weeks post partum.  6. Depression Screening negative @ 9. Discussed postpartum anxiety and she is currently has a therapist for talk therapy. Offered medical management and patient declined.

## 2025-01-02 ENCOUNTER — POSTPARTUM VISIT (OUTPATIENT)
Dept: OBGYN CLINIC | Facility: CLINIC | Age: 30
End: 2025-01-02

## 2025-01-02 VITALS — DIASTOLIC BLOOD PRESSURE: 62 MMHG | BODY MASS INDEX: 23.3 KG/M2 | SYSTOLIC BLOOD PRESSURE: 110 MMHG | WEIGHT: 127.4 LBS

## 2025-01-02 DIAGNOSIS — Z30.09 BIRTH CONTROL COUNSELING: ICD-10-CM

## 2025-01-02 PROCEDURE — 99024 POSTOP FOLLOW-UP VISIT: CPT

## 2025-01-06 ENCOUNTER — EVALUATION (OUTPATIENT)
Dept: PHYSICAL THERAPY | Facility: REHABILITATION | Age: 30
End: 2025-01-06
Payer: COMMERCIAL

## 2025-01-06 DIAGNOSIS — R53.81 PHYSICAL DECONDITIONING: ICD-10-CM

## 2025-01-06 DIAGNOSIS — R19.8 ABDOMINAL WEAKNESS: ICD-10-CM

## 2025-01-06 PROCEDURE — 97161 PT EVAL LOW COMPLEX 20 MIN: CPT

## 2025-01-06 PROCEDURE — 97110 THERAPEUTIC EXERCISES: CPT

## 2025-01-06 NOTE — PROGRESS NOTES
PT Evaluation     Today's date: 2025  Patient name: Ellyn Santana  : 1995  MRN: [id number]  Referring provider: Angelina Parish CRNP  Dx:   Encounter Diagnosis     ICD-10-CM    1. Postpartum care and examination  Z39.2       2. Abdominal weakness  R19.8       3. Physical deconditioning  R53.81           Start Time: 1100  Stop Time: 1200  Total time in clinic (min): 60 minutes    Assessment    Assessment details: Ellyn Santana is a 29 y.o.   PP female being seen for IE of PT for PP recovery care. Patient's current symptoms include: mild abdominal weakness and general deconditioning consistent w/ PP state. These impairments contribute to the following functional limitations: decreased tolerance to extended walking, extended standing, extended travel, exercise, deangelo class attendance/involvement, and impaired QOL. Ellyn Santana is a good candidate for physical therapy and would benefit from skilled physical therapy to guide progressive interventions to address the above impairments in order to allow the patient to achieve the goals listed and return to prior level of function. POC: breathing mechanics, functional strengthening, abdominal strengthening, ergonomic training, and graded exercise progression/endurance training.    Pt is in agreement with recommended plan of care and goals for therapy, and demonstrates motivation for active participation in proposed plan of care.        Goals  Function:   In 6 weeks, pt will report improvement in tolerance to 2 mile walk on treadmill at 2.7 mph or greater to indicate improved endurance and readiness for discharge.    In 3 weeks, patient will be able to perform 30 second  high plank with bilateral legs extended with proper breathing mechanics and no abdominal bulging to indicate proper core tensioning.  In 6 weeks, patient will report ability to tolerate 1 Deangelo class this week with no modifications required during exercise.  In 3 weeks, patient will be able to  perform 1 mile walk on treadmill at 2.7 mph speed or greater to indicate increased endurance since evaluation.          Plan    Frequency: 1x week  Duration in weeks: 6  Plan of Care beginning date: 2025  Plan of Care expiration date: 2025        Subjective    Chief Complaint: postpartum state  HPI: Ellyn Santana is a 29 y.o.  female who presents ~6 weeks  s/p C Section. Baby Adonay was born at 39 weeks gestation and weighed 6lb 14oz. patient reports overall feeling well.  However, wishes to progress safely through exercise to achieve PLOF.  Goal to return to Deangelo x 2 a week and strength training.  Has been performing scar massage daily with no pain.  Denies bladder and bowel deficits in need of skilled PT care.  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 [place] stool type 1-4.  Mild straining, uses squatty potty at home and feels like that helps. Has been improving, is planning to increase water intake.   Denies bleeding, constipation, diarrhea , hemorrhoids, fecal smearing, and painful evacuation   GYN:      with  delivery (Baby Adonay,  Eligio)   Lochia: No - ceased ~2 weeks ago.   Baby is feeding by breast/bottle. Has used [facility] for lactation support. Sleep has been challenging over past week.   Has been performing scar massage daily.  Reports high levels of stress for weeks following delivery, however has reached out to therapist and feels things are progressing well emotionally. Affirms support system present.    Sexual Function:     Sexually Active: Not yet, has been cleared by OB/GYN.  Denies pelvic pain.   MSK:      Current exercise: Has gone on 2 walks (~1 mi) endurance limiting factor. Goal to return to: Deangelo x 2 and strength training  and walking for endurance training.   Pain:      Denies pain.  However positive for overall feeling of weakness and fatigue compared to before delivery.   Goals:     Increase strength  Progress safe exercise  Return to Deangelo  Return to strength training  Continue to heal abdominal wall       Objective       Abdominal Assessment:      Abdominal Assessment: Well-healed, good tone  Curl up: No coning or bogginess felt, good TA activation  Bilateral leg lift: Abdominal doming but no coning or bogginess felt  Scar present: Well-healed, minimum restrictions throughout however increased on R side compared to left, good mobility, sensation diminished on 3 cm bilaterally and sensation absent at central 3 cm, no tenderness to palpation  Denied tenderness                   Precautions: PP  Patient Active Problem List   Diagnosis    History of miscarriage    Status post primary low transverse  section    Prenatal care, third trimester    Uterine size date discrepancy    Anemia         Diagnosis:    POC expires (Date that your POC expires) Auth Status? (BOMN, approved, pending) Unit limit (Daily) Auth Start date Expiration date PT/OT + Visit Limit?   2025  BOMN    30     Date of Service         Visits Used 1        Visits Remaining 29        Medbridge Created          Yes        Neuro Re-Ed                  Ther Ex         Hip strengthening         Functional Strengthening         Abdominal Strengthening TA activation in hook lying  5'    Clamshells x 20    Forward rock backs 3-second hold x 20    Glue bridge x 20    HEP    Box w/ trunk rotation #30 x15 each side  **       Aerobic  ** Interval training (as tolerated - may extended to future visits       Therapeutic Rest Breaks         Mobility          High Impact         UE Strengthening   **                Ther Activity         Ergonomics                  Manual Ther         PFM exam         Ortho exam         Dynamometer Testing         Fascial Decompression   Scar massage **       Bowel Massage                  Modalities                           Outcome Measure         EPDS 8

## 2025-01-13 ENCOUNTER — OFFICE VISIT (OUTPATIENT)
Dept: PHYSICAL THERAPY | Facility: REHABILITATION | Age: 30
End: 2025-01-13
Payer: COMMERCIAL

## 2025-01-13 DIAGNOSIS — R53.81 PHYSICAL DECONDITIONING: ICD-10-CM

## 2025-01-13 DIAGNOSIS — R19.8 ABDOMINAL WEAKNESS: ICD-10-CM

## 2025-01-13 DIAGNOSIS — M62.89 HIGH-TONE PELVIC FLOOR DYSFUNCTION: ICD-10-CM

## 2025-01-13 PROCEDURE — 97110 THERAPEUTIC EXERCISES: CPT

## 2025-01-13 NOTE — PROGRESS NOTES
Daily Note     Today's date: 2025  Patient name: Ellyn Santana  : 1995  MRN: [id number]  Referring provider: Angelina Parish CRNP  Dx:   Encounter Diagnosis     ICD-10-CM    1. Postpartum care and examination  Z39.2       2. Abdominal weakness  R19.8       3. Physical deconditioning  R53.81       4. High-tone pelvic floor dysfunction  M62.89           Start Time: 1800  Stop Time: 1900  Total time in clinic (min): 60 minutes    Chief Complaint: postpartum state  HPI: Ellyn Santana is a 29 y.o.  female who presents ~6 weeks  s/p C Section. Baby Adonay was born at 39 weeks gestation and weighed 6lb 14oz. patient reports overall feeling well.  However, wishes to progress safely through exercise to achieve PLOF.  Goal to return to Deangelo x 2 a week and strength training.  Has been performing scar massage daily with no pain.  Denies bladder and bowel deficits in need of skilled PT care.  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 Amenable to PFM assessment.       Objective: See treatment diary below    PFM Assessment:  Power: 2/5  Endurance: 6s  Fast Flicks: 4  Relaxation: 25% - able to initiate PFM eccentric lengthening w/ inhale, however diminished and delayed   Notes: Digital insertion to 100% w/ increased time (~30 min), frequent cues for PFM relaxation         Assessment: Tolerated treatment well. Patient tolerated PFM assessment well. Pt presents w/ high resting tone. Pt tolerated PFM stretching fair. Patient tolerated PFM stretching well w/ 3/10 or less pain noted throughout. Noted increased soreness in R side. Able to tolerate digital insertion, however increased time and cues required. Provided HEP for PFM relaxation w/ breath. Next visit, PFM stretching w/ winback.      Patient would benefit from continued PT.       Plan: Continue per plan of care.      Precautions: PP  Patient  Active Problem List   Diagnosis    History of miscarriage    Status post primary low transverse  section    Prenatal care, third trimester    Uterine size date discrepancy    Anemia         Diagnosis:    POC expires (Date that your POC expires) Auth Status? (BOMN, approved, pending) Unit limit (Daily) Auth Start date Expiration date PT/OT + Visit Limit?   2025  BOMN    30     Date of Service        Visits Used 1 2       Visits Remaining 29        Medbridge Created          Yes        Neuro Re-Ed                  Ther Ex         Hip strengthening         Functional Strengthening         Abdominal Strengthening TA activation in hook lying  5'    Clamshells x 20    Forward rock backs 3-second hold x 20    Glue bridge x 20    HEP    Box w/ trunk rotation #30 x15 each side         Aerobic         Therapeutic Rest Breaks         Mobility          High Impact         UE Strengthening   **                Ther Activity         Ergonomics                  Manual Ther         PFM exam  Performed        Ortho exam         Dynamometer Testing         Fascial Decompression         Bowel Massage                  Modalities                           Outcome Measure         EPDS 8

## 2025-01-15 ENCOUNTER — OFFICE VISIT (OUTPATIENT)
Dept: PHYSICAL THERAPY | Facility: REHABILITATION | Age: 30
End: 2025-01-15
Payer: COMMERCIAL

## 2025-01-15 DIAGNOSIS — R19.8 ABDOMINAL WEAKNESS: ICD-10-CM

## 2025-01-15 DIAGNOSIS — R53.81 PHYSICAL DECONDITIONING: ICD-10-CM

## 2025-01-15 DIAGNOSIS — M62.89 HIGH-TONE PELVIC FLOOR DYSFUNCTION: ICD-10-CM

## 2025-01-15 PROCEDURE — 97530 THERAPEUTIC ACTIVITIES: CPT

## 2025-01-15 PROCEDURE — 97140 MANUAL THERAPY 1/> REGIONS: CPT

## 2025-01-15 NOTE — PROGRESS NOTES
Daily Note     Today's date: 1/15/2025  Patient name: Ellyn Santana  : 1995  MRN: [id number]  Referring provider: Angelina Parish CRNP  Dx:   Encounter Diagnosis     ICD-10-CM    1. Postpartum care and examination  Z39.2       2. Abdominal weakness  R19.8       3. Physical deconditioning  R53.81       4. High-tone pelvic floor dysfunction  M62.89             Start Time: 1630  Stop Time: 1730  Total time in clinic (min): 60 minutes    Chief Complaint: postpartum state  HPI: Ellyn Santana is a 29 y.o.  female who presents ~6 weeks  s/p C Section. Baby Adonay was born at 39 weeks gestation and weighed 6lb 14oz. patient reports overall feeling well.  However, wishes to progress safely through exercise to achieve PLOF.  Goal to return to Deangelo x 2 a week and strength training.  Has been performing scar massage daily with no pain.  Denies bladder and bowel deficits in need of skilled PT care.  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  Patient's spouse, Eligio, was present during session.  Very supportive and communicative throughout.    Objective: See treatment diary below    PFM Assessment:  Power: 2/5  Endurance: 6s  Fast Flicks: 4  Relaxation: 25% - able to initiate PFM eccentric lengthening w/ inhale, however diminished and delayed   Notes: Digital insertion to 100% w/ increased time (~30 min), frequent cues for PFM relaxation         Assessment: Tolerated treatment well. Patient tolerated PFM assessment well. Pt presents w/ high resting tone. Pt tolerated PFM stretching well. Increased tolerance for speed of 100% digital insertion.  Patient tolerated PFM stretching well w/ 4/10 or less pain noted throughout. Noted increased soreness in L side (layer 1-3).  Educated patient's spouse through pelvic floor muscle stretching with good performance and min cues required.  Provided HEP for PFM  relaxation w/ breath. Next visit, PFM stretching w/ winback and/or exercise progression pending patient presentation.    Patient would benefit from continued PT.       Plan: Continue per plan of care.      Precautions: PP  Patient Active Problem List   Diagnosis    History of miscarriage    Status post primary low transverse  section    Prenatal care, third trimester    Uterine size date discrepancy    Anemia         Diagnosis:    POC expires (Date that your POC expires) Auth Status? (BOMN, approved, pending) Unit limit (Daily) Auth Start date Expiration date PT/OT + Visit Limit?   2025  BOMN    30     Date of Service 1/6 1/13 1/15      Visits Used 1 2       Visits Remaining 29        Medbridge Created          Yes        Neuro Re-Ed                  Ther Ex         Hip strengthening         Functional Strengthening         Abdominal Strengthening TA activation in hook lying  5'    Clamshells x 20    Forward rock backs 3-second hold x 20    Glue bridge x 20    HEP    Box w/ trunk rotation #30 x15 each side         Aerobic         Therapeutic Rest Breaks         Mobility          High Impact         UE Strengthening   **                Ther Activity         Ergonomics            Partner assisted pelvic floor muscle stretching  Static and dynamic  15'    Education on tactics for pain-free penile penetration  10'      Manual Ther         PFM exam  Performed        Ortho exam         Dynamometer Testing         Fascial Decompression         Bowel Massage            Pelvic floor muscle stretching when back  30'       Modalities                           Outcome Measure         EPDS 8

## 2025-01-21 ENCOUNTER — OFFICE VISIT (OUTPATIENT)
Age: 30
End: 2025-01-21
Payer: COMMERCIAL

## 2025-01-21 VITALS — DIASTOLIC BLOOD PRESSURE: 76 MMHG | SYSTOLIC BLOOD PRESSURE: 114 MMHG

## 2025-01-21 PROCEDURE — 99215 OFFICE O/P EST HI 40 MIN: CPT | Performed by: STUDENT IN AN ORGANIZED HEALTH CARE EDUCATION/TRAINING PROGRAM

## 2025-01-21 NOTE — PROGRESS NOTES
BREAST FEEDING FOLLOW UP VISIT    Informant/Relationship: mother and father     Discussion of General Lactation Issues: parents are here for follow up, they saw ST who also noted restricted tongue movement, spoke with a family and friends (ones who are in dental field) who also think it would be helpful to get the frenotomy, he has been wanting to be on the breast more lately, still no pain with feeding     Infant is 8 weeks old today.    Interval Breastfeeding History:    Frequency of breast feedin-2.5 hours  Does mother feel breastfeeding is effective: Yes, somewhat, seems tired towards the end   Does infant appear satisfied after nursing:Yes  Stooling pattern normal:Yes  Urinating frequently:Yes  Using shield or shells:No    Alternative/Artificial Feedings:   Bottle: Yes  Cup: No  Syringe/Finger: No           Formula Type: n/a                     Amount: n/a            Breast Milk:                      Amount: 4 oz in 15 minutes but seems tired towards the end             Frequency Q 2-2.5  Hr between feedings  Elimination Problems: No    Equipment:    Pump            Type: spectra             Frequency of Use: 1-2 times per day   4-8 oz     Equipment Problems: no      Mom:  Breast: small/medium sized, rounded, full  Nipple Assessment in General: Normal: elongated/eraser, no discoloration and no damage noted.  Mother's Awareness of Feeding Cues                 Recognizes: Yes                  Verbalizes: Yes  Support System: FOB  History of Breastfeeding: none  Changes/Stressors/Violence: none  Concerns/Goals: would like to do frenotomy to eliminate that as a reason for his feeding difficulties     Problems with Mom: none    Physical Exam  Constitutional:       General: She is not in acute distress.  Cardiovascular:      Rate and Rhythm: Normal rate and regular rhythm.   Pulmonary:      Effort: Pulmonary effort is normal.      Breath sounds: Normal breath sounds.   Musculoskeletal:      Right lower leg: No  edema.      Left lower leg: No edema.   Neurological:      General: No focal deficit present.      Mental Status: She is alert.   Psychiatric:         Mood and Affect: Mood normal.         Behavior: Behavior normal.       Infant:  Behaviors: Alert  Color: healthy  Birth weight: 3190 g  Current weight: 4165 g    Problems with infant: ankyloglossia     General Appearance:  Alert, active, no distress                             Head:  Normocephalic, AFOF, sutures opposed                             Eyes:  Conjunctiva clear, no drainage                              Ears:  Normally placed, no anomolies                             Nose:  Septum intact, no drainage or erythema                           Mouth:  No lesions, tongue barely extends to lower alveolar ridge, whole body of tongue tips on side when lateralizing, tip of tongue lifts up only slightly when crying, not interested in sucking on my finger today, frenulum attaches about 1/2 way down tongue and is thin but with moderate elasticity                     Neck:  Supple, symmetrical, trachea midline, no adenopathy; thyroid: no enlargement, symmetric, no tenderness/mass/nodules                 Respiratory:  No grunting, flaring, retractions, breath sounds clear and equal            Cardiovascular:  Regular rate and rhythm. No murmur. Adequate perfusion/capillary refill. Femoral pulse present                    Abdomen:   Soft, non-tender, no masses, bowel sounds present, no HSM             Genitourinary:  Normal male, testes descended, no discharge, swelling, or pain, anus patent                          Spine:   No abnormalities noted        Musculoskeletal:  Full range of motion          Skin/Hair/Nails:   Skin warm, dry, and intact, no rashes or abnormal dyspigmentation or lesions                Neurologic:   No abnormal movement, tone appropriate for gestational age     Latch: after procedure  Efficiency:               Lips Flanged: Yes               "Depth of latch: deep              Audible Swallow: Yes              Visible Milk: Yes              Wide Open/ Asymmetrical: Yes              Suck Swallow Cycle: Breathing: non labored, Coordinated: yes  Nipple Assessment after latch: Normal: elongated/eraser, no discoloration and no damage noted.  Latch Problems: initially upset at the breast right after procedure but once he latched and starting feeding he did well     Position:  Infant's Ergonomics/Body               Body Alignment: Yes               Head Supported: Yes               Close to Mom's body/ Lifted/ Supported: Yes               Mom's Ergonomics/Body: Yes                           Supported: Yes                           Sitting Back: Yes                           Brings Baby to her breast: Yes  Positioning Problems: none     Education:  Reviewed Latch: Reviewed how to gently compress the breast as if offering a sandwich to facilitate a deeper latch.   Reviewed Positioning for Dyad: Reviewed how to bring baby to the breast so that his lower lip and chin touch the breast with his nose just above the nipple to encourage a wider, more asymmetric latch.  Reviewed Frequency/Supply & Demand: Nurse on demand: when baby gives hunger cues; when your breasts feel full, or at least every 3 hours during the day and every 5 hours at night counting from the beginning of one feeding to the beginning of the next; which ever comes first. When sucking and swallowing slow, gently compress the breast to restart flow. If active suck-swallow does not restart, gently remove the baby and offer the other breast; offering up to \"four\" breasts per feeding.  Reviewed Infant:Cues and varied States of Awareness        Plan:    Discussed history and physical exams with Ellyn. Support given for her commitment to providing breast milk for her baby. Discussed the findings on the baby's exam consistent with tongue tie and reviewed how this may be the cause of nipple trauma, nipple " pain, nipple damage, poor milk transfer, blocked ducts, mastitis, and loss of milk production. Discussed the science that supports performing the frenotomy to improve latch.   Continue feeding on demand.   Follow up next week.     I have spent 75 minutes with Patient and family today in which greater than 50% of this time was spent in counseling/coordination of care regarding Prognosis, Risks and benefits of tx options, Instructions for management, Patient and family education, Impressions, Counseling / Coordination of care, Documenting in the medical record, Reviewing / ordering tests, medicine, procedures  , and Obtaining or reviewing history  .

## 2025-01-22 ENCOUNTER — APPOINTMENT (OUTPATIENT)
Dept: PHYSICAL THERAPY | Facility: REHABILITATION | Age: 30
End: 2025-01-22
Payer: COMMERCIAL

## 2025-01-28 ENCOUNTER — OFFICE VISIT (OUTPATIENT)
Age: 30
End: 2025-01-28
Payer: COMMERCIAL

## 2025-01-28 ENCOUNTER — APPOINTMENT (OUTPATIENT)
Dept: PHYSICAL THERAPY | Facility: REHABILITATION | Age: 30
End: 2025-01-28
Payer: COMMERCIAL

## 2025-01-28 VITALS — DIASTOLIC BLOOD PRESSURE: 68 MMHG | SYSTOLIC BLOOD PRESSURE: 112 MMHG

## 2025-01-28 PROCEDURE — 99214 OFFICE O/P EST MOD 30 MIN: CPT | Performed by: STUDENT IN AN ORGANIZED HEALTH CARE EDUCATION/TRAINING PROGRAM

## 2025-01-28 NOTE — PROGRESS NOTES
BREAST FEEDING FOLLOW UP VISIT    Informant/Relationship: mother    Discussion of General Lactation Issues: here for frenotomy follow up from last week, mom feels like he's doing well overall, saw [facility] this morning, they discharged him, Ellyn has noticed that he pulls in more of her breast into his mouth and he isn't doing the head bobbing towards the end of his feeds any more like he's tired, he does currently have a mild cold that he is battling, saw PCP yesterday and gaining weight well     Infant is 2 months old today.    Interval Breastfeeding History:    Frequency of breast feedin-2.5 hours during day, sometimes 4.5 hours over night   Does mother feel breastfeeding is effective: Yes  Does infant appear satisfied after nursing:Yes  Stooling pattern normal: Yes  Urinating frequently: Yes  Using shield or shells: No    Alternative/Artificial Feedings:   Bottle: Yes, doing well with paced feeding   Cup: No  Syringe/Finger: No           Formula Type: none                     Amount: none            Breast Milk:                      Amount: 4-4.5 oz , usually only gets 1-2 bottles per day             Frequency Q 3 Hr between feedings  Elimination Problems: No    Equipment:  Pump            Type: spectra             Frequency of Use: once or twice a day, getting about 4-9 oz total per session     Equipment Problems: no    Mom:  Breast: Not performed today  Nipple Assessment in General: Normal: elongated/eraser, no discoloration and no damage noted.  Mother's Awareness of Feeding Cues                 Recognizes: Yes                  Verbalizes: Yes  Support System: FOB  History of Breastfeeding: none  Changes/Stressors/Violence: improved feeding   Concerns/Goals: none     Problems with Mom: none    Physical Exam  Constitutional:       General: She is not in acute distress.  Pulmonary:      Effort: Pulmonary effort is normal.   Neurological:      General: No focal deficit present.      Mental Status: She is alert.    Psychiatric:         Mood and Affect: Mood normal.         Behavior: Behavior normal.         Infant:  Behaviors: Alert  Color: healthy   Birth weight: 3190 g  Current weight: 4550 g     Problems with infant: none     General Appearance:  Alert, active, no distress                             Head:  Normocephalic, AFOF, sutures opposed                             Eyes:  Conjunctiva clear, no drainage                              Ears:  Normally placed, no anomolies                             Nose:  Septum intact, no drainage or erythema                           Mouth:  No lesions, tongue lifts up all the way and curls with crying, extends to lower lip, good lateralization bilaterally, Adonay was upset during exam and didn't want to participate with sucking on my finger well, there is good healing underneath tongue                     Neck:  Supple, symmetrical                 Respiratory:  No grunting, flaring, retractions, breath sounds clear and equal            Cardiovascular:  Regular rate and rhythm. No murmur. Adequate perfusion/capillary refill.                   Abdomen:   Soft, non-tender, no masses, bowel sounds present, no HSM        Musculoskeletal:  Full range of motion          Skin/Hair/Nails:   Skin warm, dry, and intact, no rashes or abnormal dyspigmentation or lesions                Neurologic:   No abnormal movement, tone appropriate for gestational age     Latch:  Efficiency:               Lips Flanged: Yes              Depth of latch: deep              Audible Swallow: Yes              Visible Milk: Yes              Wide Open/ Asymmetrical: Yes              Suck Swallow Cycle: Breathing: non labored, Coordinated: yes  Nipple Assessment after latch: Normal: elongated/eraser, no discoloration and no damage noted.  Latch Problems: none     Position:  Infant's Ergonomics/Body               Body Alignment: Yes               Head Supported: Yes               Close to Mom's body/ Lifted/  "Supported: Yes               Mom's Ergonomics/Body: Yes                           Supported: Yes                           Sitting Back: Yes                           Brings Baby to her breast: Yes  Positioning Problems: none       Education:  Reviewed Frequency/Supply & Demand: Nurse on demand: when baby gives hunger cues; when your breasts feel full, or at least every 3 hours during the day and every 5 hours at night counting from the beginning of one feeding to the beginning of the next; which ever comes first. When sucking and swallowing slow, gently compress the breast to restart flow. If active suck-swallow does not restart, gently remove the baby and offer the other breast; offering up to \"four\" breasts per feeding.   Reviewed Infant:Cues and varied States of Awareness  Reviewed Alternative/Artificial Feedings: paced bottle feeding   Reviewed Equipment: pumping       Plan:    Both Ellyn and Adonay are having a good breastfeeding experience.   Ellyn is not going back to work until adonay is about 8-9 months old. At that time he will have started solid foods and Ellyn will only need to pump enough for the next day at his usual feeding times.   For now I recommend current pumping schedule.   Feel free to call our office with any questions that my arise or concerns in the future.     I have spent 30 minutes with Patient and family today in which greater than 50% of this time was spent in counseling/coordination of care regarding Patient and family education, Impressions, Counseling / Coordination of care, Documenting in the medical record, Reviewing / ordering tests, medicine, procedures  , and Obtaining or reviewing history  .                                                                             "

## 2025-01-30 ENCOUNTER — APPOINTMENT (OUTPATIENT)
Dept: PHYSICAL THERAPY | Facility: REHABILITATION | Age: 30
End: 2025-01-30
Payer: COMMERCIAL

## 2025-02-03 ENCOUNTER — OFFICE VISIT (OUTPATIENT)
Dept: PHYSICAL THERAPY | Facility: REHABILITATION | Age: 30
End: 2025-02-03
Payer: COMMERCIAL

## 2025-02-03 DIAGNOSIS — M62.89 HIGH-TONE PELVIC FLOOR DYSFUNCTION: ICD-10-CM

## 2025-02-03 DIAGNOSIS — R19.8 ABDOMINAL WEAKNESS: ICD-10-CM

## 2025-02-03 DIAGNOSIS — R53.81 PHYSICAL DECONDITIONING: ICD-10-CM

## 2025-02-03 PROCEDURE — 97140 MANUAL THERAPY 1/> REGIONS: CPT

## 2025-02-03 PROCEDURE — 97110 THERAPEUTIC EXERCISES: CPT

## 2025-02-03 NOTE — PROGRESS NOTES
Daily Note     Today's date: 2/3/2025  Patient name: Ellyn Santana  : 1995  MRN: [id number]  Referring provider: Angelina Parish CRNP  Dx:   Encounter Diagnosis     ICD-10-CM    1. Postpartum care and examination  Z39.2       2. Abdominal weakness  R19.8       3. Physical deconditioning  R53.81       4. High-tone pelvic floor dysfunction  M62.89               Start Time: 1800  Stop Time: 1900  Total time in clinic (min): 60 minutes    Chief Complaint: postpartum state  HPI: Ellyn Santana is a 29 y.o.  female who presents ~6 weeks  s/p C Section. Baby Adonay was born at 39 weeks gestation and weighed 6lb 14oz. patient reports overall feeling well.  However, wishes to progress safely through exercise to achieve PLOF.  Goal to return to [employer] x 2 a week and strength training.  Has been performing scar massage daily with no pain.  Denies bladder and bowel deficits in need of skilled PT care.  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 However, did perform partner stretching once that went well. Single PIV sex attempt w/ ~ 25% insertion tolerate w/ breath mechanics.     Objective: See treatment diary below      Assessment: Tolerated treatment well. Patient tolerated PFM stretching well w/ 4/10 or less pain noted throughout. Noted increased soreness in L side layer 3, however able to tolerate both static and dynamic stretching. Winback used throughout. Incorporated pball mobility for increased global relaxation.        Patient would benefit from continued PT.       Plan: Continue per plan of care.      Precautions: PP  Patient Active Problem List   Diagnosis    History of miscarriage    Status post primary low transverse  section    Prenatal care, third trimester    Uterine size date discrepancy    Anemia         Diagnosis:    POC expires (Date that your POC expires) Auth Status? (BOMN,  approved, pending) Unit limit (Daily) Auth Start date Expiration date PT/OT + Visit Limit?   2/17/2025  BOMN    30     Date of Service 1/6 1/13 1/15 2/3     Visits Used 1 2 3 4     Visits Remaining 29 28 27 26     Medbridge Created          Yes        Neuro Re-Ed                  Ther Ex         Hip strengthening         Functional Strengthening         Abdominal Strengthening TA activation in hook lying  5'    Clamshells x 20    Forward rock backs 3-second hold x 20    Glue bridge x 20    HEP    Box w/ trunk rotation #30 x15 each side         Aerobic         Therapeutic Rest Breaks         Mobility     Pball mobility 15'      High Impact         UE Strengthening   **                Ther Activity         Ergonomics            Partner assisted pelvic floor muscle stretching  Static and dynamic  15'    Education on tactics for pain-free penile penetration  10'      Manual Ther         PFM exam  Performed        Ortho exam         Dynamometer Testing         Fascial Decompression         Bowel Massage            Pelvic floor muscle stretching when back  30'  Pelvic floor muscle stretching winback  45'     Modalities                           Outcome Measure         EPDS 8

## 2025-02-12 ENCOUNTER — OFFICE VISIT (OUTPATIENT)
Dept: PHYSICAL THERAPY | Facility: REHABILITATION | Age: 30
End: 2025-02-12
Payer: COMMERCIAL

## 2025-02-12 DIAGNOSIS — R53.81 PHYSICAL DECONDITIONING: ICD-10-CM

## 2025-02-12 DIAGNOSIS — R19.8 ABDOMINAL WEAKNESS: ICD-10-CM

## 2025-02-12 DIAGNOSIS — M62.89 HIGH-TONE PELVIC FLOOR DYSFUNCTION: ICD-10-CM

## 2025-02-12 PROCEDURE — 97110 THERAPEUTIC EXERCISES: CPT

## 2025-02-12 PROCEDURE — 97140 MANUAL THERAPY 1/> REGIONS: CPT

## 2025-02-12 NOTE — PROGRESS NOTES
Daily Note     Today's date: 2025  Patient name: Ellyn Santana  : 1995  MRN: [id number]  Referring provider: Angelina Parish CRNP  Dx:   Encounter Diagnosis     ICD-10-CM    1. Postpartum care and examination  Z39.2       2. Abdominal weakness  R19.8       3. Physical deconditioning  R53.81       4. High-tone pelvic floor dysfunction  M62.89             Start Time: 1100  Stop Time: 1200  Total time in clinic (min): 60 minutes    Chief Complaint: postpartum state  HPI: Ellyn Santana is a 29 y.o.  female who presents ~6 weeks  s/p C Section. Baby Adonay was born at 39 weeks gestation and weighed 6lb 14oz. patient reports overall feeling well.  However, wishes to progress safely through exercise to achieve PLOF.  Goal to return to [employer] x 2 a week and strength training.  Has been performing scar massage daily with no pain.  Denies bladder and bowel deficits in need of skilled PT care.  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     Objective: See treatment diary below      Assessment: Tolerated treatment well. Patient tolerated PFM stretching well w/ 2/10 or less pain noted throughout. Continued increased soreness of L side, however able to tolerate both static and dynamic stretching w/ mod pressure. Winback used throughout. Incorporated pball mobility for increased global relaxation.        Patient would benefit from continued PT.       Plan: Continue per plan of care.      Precautions: PP  Patient Active Problem List   Diagnosis    History of miscarriage    Status post primary low transverse  section    Prenatal care, third trimester    Uterine size date discrepancy    Anemia         Diagnosis:    POC expires (Date that your POC expires) Auth Status? (BOMN, approved, pending) Unit limit (Daily) Auth Start date Expiration date PT/OT + Visit  Limit?   2/17/2025  BOMN    30     Date of Service 1/6 1/13 1/15 2/3 2/12    Visits Used 1 2 3 4 5    Visits Remaining 29 28 27 26 25    Medbridge Created          Yes        Neuro Re-Ed                  Ther Ex         Hip strengthening         Functional Strengthening         Abdominal Strengthening TA activation in hook lying  5'    Clamshells x 20    Forward rock backs 3-second hold x 20    Glue bridge x 20    HEP    Box w/ trunk rotation #30 x15 each side         Aerobic         Therapeutic Rest Breaks         Mobility     Pball mobility 15'  Pball mobility 15'   (Pelvic circles, pelvic tilts, thoracic ex in child's pose, or in sitting)     High Impact         UE Strengthening   **                Ther Activity         Ergonomics            Partner assisted pelvic floor muscle stretching  Static and dynamic  15'    Education on tactics for pain-free penile penetration  10'      Manual Ther         PFM exam  Performed        Ortho exam         Dynamometer Testing         Fascial Decompression         Bowel Massage            Pelvic floor muscle stretching when back  30'  Pelvic floor muscle stretching winback  45' Pelvic floor muscle stretching winback  45'    Modalities                           Outcome Measure         EPDS 8

## 2025-02-17 ENCOUNTER — OFFICE VISIT (OUTPATIENT)
Dept: PHYSICAL THERAPY | Facility: REHABILITATION | Age: 30
End: 2025-02-17
Payer: COMMERCIAL

## 2025-02-17 DIAGNOSIS — R19.8 ABDOMINAL WEAKNESS: ICD-10-CM

## 2025-02-17 DIAGNOSIS — R53.81 PHYSICAL DECONDITIONING: ICD-10-CM

## 2025-02-17 DIAGNOSIS — M62.89 HIGH-TONE PELVIC FLOOR DYSFUNCTION: ICD-10-CM

## 2025-02-17 PROCEDURE — 97110 THERAPEUTIC EXERCISES: CPT

## 2025-02-17 PROCEDURE — 97140 MANUAL THERAPY 1/> REGIONS: CPT

## 2025-02-17 NOTE — PROGRESS NOTES
Daily Note     Today's date: 2025  Patient name: Ellyn Santana  : 1995  MRN: [id number]  Referring provider: Angelina Parish CRNP  Dx:   Encounter Diagnosis     ICD-10-CM    1. Postpartum care and examination  Z39.2       2. Abdominal weakness  R19.8       3. Physical deconditioning  R53.81       4. High-tone pelvic floor dysfunction  M62.89               Start Time: 1000  Stop Time: 1100  Total time in clinic (min): 60 minutes    Chief Complaint: postpartum state  HPI: Ellyn Santana is a 29 y.o.  female who presents ~6 weeks  s/p C Section. Baby Adonay was born at 39 weeks gestation and weighed 6lb 14oz. patient reports overall feeling well.  However, wishes to progress safely through exercise to achieve PLOF.  Goal to return to [employer] x 2 a week and strength training.  Has been performing scar massage daily with no pain.  Denies bladder and bowel deficits in need of skilled PT care.  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 Movement still problematic.     Objective: See treatment diary below      Assessment: Tolerated treatment well. Patient tolerated PFM stretching well w/ 1/10 or less pain noted throughout, able to tolerate 2 digits for increased stretch for portion of session w/ proper breathing mechanics and good PFM stretch tolerance. Winback used throughout. Incorporated pball mobility for increased global relaxation.      Pt reports she is progressing well toward goals and feels like she will likely be ready for d.c next visit.       Patient would benefit from continued PT.       Plan: Discharge [facility] pending pt presentation.      Precautions: PP  Patient Active Problem List   Diagnosis    History of miscarriage    Status post primary low transverse  section    Prenatal care, third trimester    Uterine size date discrepancy    Anemia         Diagnosis:    POC expires  (Date that your POC expires) Auth Status? (BOMN, approved, pending) Unit limit (Daily) Auth Start date Expiration date PT/OT + Visit Limit?   2/17/2025  BOMN    30     Date of Service 1/6 1/13 1/15 2/3 2/12    Visits Used 1 2 3 4 5    Visits Remaining 29 28 27 26 25    Medbridge Created          Yes        Neuro Re-Ed                  Ther Ex         Hip strengthening         Functional Strengthening         Abdominal Strengthening TA activation in hook lying  5'    Clamshells x 20    Forward rock backs 3-second hold x 20    Glue bridge x 20    HEP    Box w/ trunk rotation #30 x15 each side         Aerobic         Therapeutic Rest Breaks         Mobility     Pball mobility 15'  Pball mobility 15'   (Pelvic circles, pelvic tilts, thoracic ex in child's pose, or in sitting)  Pball mobility 15'   (Pelvic circles, pelvic tilts, thoracic ex in child's pose, or in sitting)    High Impact         UE Strengthening   **                Ther Activity         Ergonomics            Partner assisted pelvic floor muscle stretching  Static and dynamic  15'    Education on tactics for pain-free penile penetration  10'      Manual Ther         PFM exam  Performed        Ortho exam         Dynamometer Testing         Fascial Decompression         Bowel Massage            Pelvic floor muscle stretching when back  30'  Pelvic floor muscle stretching winback  45' Pelvic floor muscle stretching winback  45' Pelvic floor muscle stretching winback  45'  Single and 2 digit, static but 90% dynamic stretching w/ mod/max pressure   Modalities                           Outcome Measure         EPDS 8

## 2025-02-26 ENCOUNTER — OFFICE VISIT (OUTPATIENT)
Dept: PHYSICAL THERAPY | Facility: REHABILITATION | Age: 30
End: 2025-02-26
Payer: COMMERCIAL

## 2025-02-26 DIAGNOSIS — R53.81 PHYSICAL DECONDITIONING: ICD-10-CM

## 2025-02-26 DIAGNOSIS — M62.89 HIGH-TONE PELVIC FLOOR DYSFUNCTION: ICD-10-CM

## 2025-02-26 DIAGNOSIS — R19.8 ABDOMINAL WEAKNESS: ICD-10-CM

## 2025-02-26 PROCEDURE — 97110 THERAPEUTIC EXERCISES: CPT

## 2025-02-26 PROCEDURE — 97140 MANUAL THERAPY 1/> REGIONS: CPT

## 2025-02-26 NOTE — PROGRESS NOTES
Discharge     Today's date: 2025  Patient name: Ellyn Santana  : 1995  MRN: [id number]  Referring provider: Angelina Parish CRNP  Dx:   Encounter Diagnosis     ICD-10-CM    1. Postpartum care and examination  Z39.2       2. Abdominal weakness  R19.8       3. Physical deconditioning  R53.81       4. High-tone pelvic floor dysfunction  M62.89                 Start Time: 1100  Stop Time: 1200  Total time in clinic (min): 60 minutes    Chief Complaint: postpartum state  HPI: Ellyn Santana is a 29 y.o.  female who presents ~6 weeks  s/p C Section. Baby Adonay was born at 39 weeks gestation and weighed 6lb 14oz. patient reports overall feeling well.  However, wishes to progress safely through exercise to achieve PLOF.  Goal to return to [employer] x 2 a week and strength training.  Has been performing scar massage daily with no pain.  Denies bladder and bowel deficits in need of skilled PT care.  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      Objective: See treatment diary below    PFM Assessment:  Power: 2+/5  Endurance: 10 sec  Fast Flicks: 4  Relaxation: 100% - able to initiate PFM eccentric lengthening, good stretch tolerance and no TPP  Notes: Digital insertion to 100% expected time        Assessment: Tolerated treatment well. Patient tolerated PFM stretching well w/ 0/10 or less pain noted throughout. No pain palpated w/ mox pressure on all layers and isolated muscles. Winback used throughout. Incorporated pball mobility for increased global relaxation.  Reinforced proper breathing mechanics and TA activation during exercise, good performance.  Provided patient with resources for continued exercise.  Patient reports goals have been met and is equipped to  transition to self management. Pt verbalized understanding of HEP and proper progression of interventions. Plan to discharge at this time.          Plan: Discharge     Precautions: PP  Patient Active Problem List   Diagnosis    History of miscarriage    Status post primary low transverse  section    Prenatal care, third trimester    Uterine size date discrepancy    Anemia         Diagnosis:    POC expires (Date that your POC expires) Auth Status? (BOMN, approved, pending) Unit limit (Daily) Auth Start date Expiration date PT/OT + Visit Limit?   2025  BOMN    30     Date of Service 1/6 1/13 1/15 2/3 2/12 2/26   Visits Used 1 2 3 4 5 6   Visits Remaining 29 28 27 26 25 24   Medbridge Created          Yes        Neuro Re-Ed                  Ther Ex         Hip strengthening         Functional Strengthening         Abdominal Strengthening TA activation in hook lying  5'    Clamshells x 20    Forward rock backs 3-second hold x 20    Glue bridge x 20    HEP    Box w/ trunk rotation #30 x15 each side         Aerobic         Therapeutic Rest Breaks         Mobility     Pball mobility 15'  Pball mobility 15'   (Pelvic circles, pelvic tilts, thoracic ex in child's pose, or in sitting)  Pball mobility 10'   (Pelvic circles, pelvic tilts, TA Activation)   High Impact         UE Strengthening   **                Ther Activity         Ergonomics            Partner assisted pelvic floor muscle stretching  Static and dynamic  15'    Education on tactics for pain-free penile penetration  10'      Manual Ther         PFM exam  Performed        Ortho exam         Dynamometer Testing         Fascial Decompression         Bowel Massage            Pelvic floor muscle stretching when back  30'  Pelvic floor muscle stretching winback  45' Pelvic floor muscle stretching winback  45' Pelvic floor muscle stretching winback  30'  dynamic stretching w/ mod/max pressure   Modalities                           Outcome Measure          EPDS 8

## 2025-05-29 NOTE — PROGRESS NOTES
Subjective      Ellyn Santana is a 29 y.o. female who presents for annual GYN exam.     GYN:  No menses since delivery.  Denies vaginal discharge, labial erythema or lesions, dyspareunia.  She reports recent Pelvic PT due to dyspareunia. She reports it has improved.   Contraception: condoms  Patient is  sexually active with .      OB:  OB History    Para Term  AB Living   3 1 1  2 1   SAB IAB Ectopic Multiple Live Births   2 0 0 0 1      # Outcome Date GA Lbr Shekhar/2nd Weight Sex Type Anes PTL Lv   3 Term 24 39w1d  3130 g (6 lb 14.4 oz) M CS-LTranv Spinal N MANUEL   2 SAB 2023 5w0d          1 SAB 23 6w0d                :  Denies dysuria, urinary frequency or urgency.  Denies hematuria, flank pain, incontinence.    Breast:  Denies breast mass, skin changes, dimpling, reddening, nipple retraction.  denies breast discharge.  Patient does have a family history of breast, endometrial, colon, or ovarian ca.     Cancer-related family history includes Cancer in her paternal grandfather; Prostate cancer in her maternal grandfather.    Past Medical History[1]    Past Surgical History[2]      General:  BMI: 21  Work: Teacher  Safety: feels safe at home    Social History[3]    Screening:  Cervical cancer: last pap smear in 10/24/2023. Results were NILM.   Breast cancer: last mammogram in Not on file.   Colon cancer: last colonoscopy in Not on file     Review of Systems   Constitutional:  Negative for fatigue.   Eyes:  Negative for photophobia and visual disturbance.   Respiratory:  Negative for cough and shortness of breath.    Cardiovascular:  Negative for chest pain and palpitations.   Gastrointestinal:  Negative for abdominal pain, blood in stool, constipation, diarrhea, nausea and rectal pain.   Genitourinary:  Negative for dyspareunia, dysuria, flank pain, frequency, genital sores, menstrual problem, pelvic pain, urgency, vaginal bleeding, vaginal discharge and vaginal pain.   Musculoskeletal:  " Negative for arthralgias and back pain.   Skin:  Negative for rash.   Neurological:  Negative for weakness and headaches.          Objective      /68 (BP Location: Left arm, Patient Position: Sitting, Cuff Size: Standard)   Ht 5' 2\" (1.575 m)   Wt 52.6 kg (116 lb)   LMP  (Exact Date)   Breastfeeding Yes   BMI 21.22 kg/m²   Physical Exam  Vitals and nursing note reviewed.   Constitutional:       Appearance: Normal appearance.   HENT:      Head: Normocephalic.     Eyes:      Conjunctiva/sclera: Conjunctivae normal.       Cardiovascular:      Rate and Rhythm: Normal rate and regular rhythm.      Heart sounds: Normal heart sounds.   Pulmonary:      Effort: Pulmonary effort is normal.      Breath sounds: Normal breath sounds.   Chest:   Breasts:     Right: Normal. No inverted nipple, mass, nipple discharge, skin change or tenderness.      Left: Normal. No inverted nipple, mass, nipple discharge, skin change or tenderness.   Abdominal:      General: Abdomen is flat.      Palpations: Abdomen is soft. There is no mass.      Tenderness: There is no abdominal tenderness. There is no right CVA tenderness or left CVA tenderness.   Genitourinary:     General: Normal vulva.      Exam position: Lithotomy position.      Pubic Area: No rash or pubic lice.       Labia:         Right: No rash or tenderness.         Left: No rash or tenderness.       Urethra: No prolapse or urethral pain.      Vagina: Normal. No vaginal discharge.      Cervix: Normal.      Uterus: Normal.       Adnexa: Right adnexa normal and left adnexa normal.        Right: No mass or tenderness.          Left: No mass or tenderness.       Musculoskeletal:         General: Normal range of motion.      Cervical back: Normal range of motion. No tenderness.      Right lower leg: No edema.      Left lower leg: No edema.   Lymphadenopathy:      Cervical: No cervical adenopathy.      Upper Body:      Right upper body: No supraclavicular or axillary adenopathy. "      Left upper body: No supraclavicular or axillary adenopathy.      Lower Body: No right inguinal adenopathy. No left inguinal adenopathy.     Skin:     General: Skin is warm and dry.      Findings: No rash.     Neurological:      Mental Status: She is alert and oriented to person, place, and time.     Psychiatric:         Mood and Affect: Mood normal.         Behavior: Behavior normal.         Thought Content: Thought content normal.         Judgment: Judgment normal.                 Assessment/Plan  Problem List Items Addressed This Visit    None  Visit Diagnoses         Well woman exam    -  Primary            Denies GYN concerns today  Birth control: declines  Cervical cancer screening: deferred  Breast Cancer screening: age 40  Colon cancer Screening: age 43  STD screening: declines  Reviewed healthy lifestyle and safe sex practices  RTO for annual exam or PRN      LIYA Warner  OB/GYN  2025  10:17 AM          [1]   Past Medical History:  Diagnosis Date    Miscarriage 23, 23    Spontaneous    Varicella     as a child   [2]   Past Surgical History:  Procedure Laterality Date    ME  DELIVERY ONLY N/A 2024    Procedure:  SECTION ();  Surgeon: Merle Rick MD;  Location: [facility] ;  Service: Obstetrics    WISDOM TOOTH EXTRACTION     [3]   Social History  Tobacco Use    Smoking status: Never     Passive exposure: Never    Smokeless tobacco: Never   Vaping Use    Vaping status: Never Used   Substance Use Topics    Alcohol use: Not Currently    Drug use: Never

## 2025-06-02 ENCOUNTER — ANNUAL EXAM (OUTPATIENT)
Dept: OBGYN CLINIC | Facility: CLINIC | Age: 30
End: 2025-06-02
Payer: COMMERCIAL

## 2025-06-02 VITALS
WEIGHT: 116 LBS | HEIGHT: 62 IN | SYSTOLIC BLOOD PRESSURE: 110 MMHG | BODY MASS INDEX: 21.35 KG/M2 | DIASTOLIC BLOOD PRESSURE: 68 MMHG

## 2025-06-02 DIAGNOSIS — Z01.419 WELL WOMAN EXAM: Primary | ICD-10-CM

## 2025-06-02 PROCEDURE — 99395 PREV VISIT EST AGE 18-39: CPT

## 2025-07-23 ENCOUNTER — OFFICE VISIT (OUTPATIENT)
Age: 30
End: 2025-07-23
Payer: COMMERCIAL

## 2025-07-23 VITALS
WEIGHT: 113.6 LBS | SYSTOLIC BLOOD PRESSURE: 128 MMHG | DIASTOLIC BLOOD PRESSURE: 78 MMHG | HEART RATE: 68 BPM | BODY MASS INDEX: 20.91 KG/M2 | OXYGEN SATURATION: 97 % | HEIGHT: 62 IN

## 2025-07-23 DIAGNOSIS — Z13.29 THYROID DISORDER SCREENING: ICD-10-CM

## 2025-07-23 DIAGNOSIS — Z13.89 SCREENING FOR GENITOURINARY CONDITION: ICD-10-CM

## 2025-07-23 DIAGNOSIS — Z13.1 SCREENING FOR DIABETES MELLITUS (DM): ICD-10-CM

## 2025-07-23 DIAGNOSIS — Z13.220 SCREENING, LIPID: ICD-10-CM

## 2025-07-23 DIAGNOSIS — E01.0 THYROMEGALY: ICD-10-CM

## 2025-07-23 DIAGNOSIS — Z00.00 ANNUAL PHYSICAL EXAM: Primary | ICD-10-CM

## 2025-07-23 PROCEDURE — 99385 PREV VISIT NEW AGE 18-39: CPT | Performed by: INTERNAL MEDICINE

## 2025-07-23 NOTE — PATIENT INSTRUCTIONS
"Patient Education     Routine physical for adults   The Basics   Written by the doctors and editors at [facility]   What is a physical? -- A physical is a routine visit, or \"check-up,\" with your doctor. You might also hear it called a \"wellness visit\" or \"preventive visit.\"  During each visit, the doctor will:   Ask about your physical and mental health   Ask about your habits, behaviors, and lifestyle   Do an exam   Give you vaccines if needed   Talk to you about any medicines you take   Give advice about your health   Answer your questions  Getting regular check-ups is an important part of taking care of your health. It can help your doctor find and treat any problems you have. But it's also important for preventing health problems.  A routine physical is different from a \"sick visit.\" A sick visit is when you see a doctor because of a health concern or problem. Since physicals are scheduled ahead of time, you can think about what you want to ask the doctor.  How often should I get a physical? -- It depends on your age and health. In general, for people age 21 years and older:   If you are younger than 50 years, you might be able to get a physical every 3 years.   If you are 50 years or older, your doctor might recommend a physical every year.  If you have an ongoing health condition, like diabetes or high blood pressure, your doctor will probably want to see you more often.  What happens during a physical? -- In general, each visit will include:   Physical exam - The doctor or nurse will check your height, weight, heart rate, and blood pressure. They will also look at your eyes and ears. They will ask about how you are feeling and whether you have any symptoms that bother you.   Medicines - It's a good idea to bring a list of all the medicines you take to each doctor visit. Your doctor will talk to you about your medicines and answer any questions. Tell them if you are having any side effects that bother you. You " "should also tell them if you are having trouble paying for any of your medicines.   Habits and behaviors - This includes:   Your diet   Your exercise habits   Whether you smoke, drink alcohol, or use drugs   Whether you are sexually active   Whether you feel safe at home  Your doctor will talk to you about things you can do to improve your health and lower your risk of health problems. They will also offer help and support. For example, if you want to quit smoking, they can give you advice and might prescribe medicines. If you want to improve your diet or get more physical activity, they can help you with this, too.   Lab tests, if needed - The tests you get will depend on your age and situation. For example, your doctor might want to check your:   Cholesterol   Blood sugar   Iron level   Vaccines - The recommended vaccines will depend on your age, health, and what vaccines you already had. Vaccines are very important because they can prevent certain serious or deadly infections.   Discussion of screening - \"Screening\" means checking for diseases or other health problems before they cause symptoms. Your doctor can recommend screening based on your age, risk, and preferences. This might include tests to check for:   Cancer, such as breast, prostate, cervical, ovarian, colorectal, prostate, lung, or skin cancer   Sexually transmitted infections, such as chlamydia and gonorrhea   Mental health conditions like depression and anxiety  Your doctor will talk to you about the different types of screening tests. They can help you decide which screenings to have. They can also explain what the results might mean.   Answering questions - The physical is a good time to ask the doctor or nurse questions about your health. If needed, they can refer you to other doctors or specialists, too.  Adults older than 65 years often need other care, too. As you get older, your doctor will talk to you about:   How to prevent falling at " home   Hearing or vision tests   Memory testing   How to take your medicines safely   Making sure that you have the help and support you need at home  All topics are updated as new evidence becomes available and our peer review process is complete.  This topic retrieved from [employer] on: May 02, 2024.  Topic 100272 Version 1.0  Release: 32.4.3 - C32.122  © 2024 UpToDate, Inc. and/or its affiliates. All rights reserved.  Consumer Information Use and Disclaimer   Disclaimer: This generalized information is a limited summary of diagnosis, treatment, and/or medication information. It is not meant to be comprehensive and should be used as a tool to help the user understand and/or assess potential diagnostic and treatment options. It does NOT include all information about conditions, treatments, medications, side effects, or risks that may apply to a specific patient. It is not intended to be medical advice or a substitute for the medical advice, diagnosis, or treatment of a health care provider based on the health care provider's examination and assessment of a patient's specific and unique circumstances. Patients must speak with a health care provider for complete information about their health, medical questions, and treatment options, including any risks or benefits regarding use of medications. This information does not endorse any treatments or medications as safe, effective, or approved for treating a specific patient. UpToDate, Inc. and its affiliates disclaim any warranty or liability relating to this information or the use thereof.The use of this information is governed by the Terms of Use, available at https://www.woltersJarvamuwer.com/en/know/clinical-effectiveness-terms. 2024© UpToDate, Inc. and its affiliates and/or licensors. All rights reserved.  Copyright   © 2024 UpToDate, Inc. and/or its affiliates. All rights reserved.

## 2025-07-23 NOTE — PROGRESS NOTES
"Adult Annual Physical  Name: Ellyn Santana      : 1995      MRN: [id number]  Encounter Provider: Shanna Trinh MD  Encounter Date: 2025   Encounter department: [facility]    :  Assessment & Plan  Annual physical exam         Screening for diabetes mellitus (DM)    Orders:    CBC and differential    Hemoglobin A1C; Future    Comprehensive metabolic panel    Screening, lipid    Orders:    Lipid panel    Screening for genitourinary condition    Orders:    Urinalysis with microscopic; Future    Thyroid disorder screening    Orders:    TSH, 3rd generation with Free T4 reflex    Thyromegaly    Orders:    US thyroid; Future    Screening for diabetes mellitus (DM)         Screening, lipid         Screening for genitourinary condition         Thyroid disorder screening         Thyromegaly         Annual physical exam               Preventive Screenings:    - Cervical cancer screening: screening up-to-date     Immunizations:  - Immunizations due: Hepatitis A       Patient is a pleasant 29-year-old female is here to establish care and for annual physical.  She delivered her first child about 8 months ago  because of breech presentation.  Pregnancy was otherwise unremarkable.  She has been doing quite well.  Mild postpartum blues have resolved.  She would be starting work soon.  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  History of Present Illness     Adult Annual Physical:  Patient presents for annual physical.     Diet and Physical Activity:  - Diet/Nutrition: no special diet.  - Exercise: walking.    Depression Screening:  - PHQ-2 Score: 0    General Health:  - Sleep: 7-8 hours of sleep on average.  - Hearing: normal hearing bilateral ears.  - Vision: no vision problems.  - Dental: brushes teeth twice daily.    Review of Systems      Objective   /78 (BP Location: Right arm, Patient Position: Sitting, Cuff Size: Standard)   Pulse 68   Ht 5' 2\" (1.575 m)   Wt " 51.5 kg (113 lb 9.6 oz)   SpO2 97%   Breastfeeding Yes   BMI 20.78 kg/m²     Physical Exam  Constitutional:       General: She is not in acute distress.     Appearance: She is well-developed.   HENT:      Head: Normocephalic.      Mouth/Throat:      Pharynx: No oropharyngeal exudate.     Eyes:      General: No scleral icterus.     Conjunctiva/sclera: Conjunctivae normal.     Neck:      Thyroid: No thyromegaly.      Vascular: No carotid bruit.      Comments: Right thyroid lobe prominence  Cardiovascular:      Rate and Rhythm: Normal rate and regular rhythm.      Heart sounds: Normal heart sounds. No murmur heard.  Pulmonary:      Effort: Pulmonary effort is normal. No respiratory distress.      Breath sounds: Normal breath sounds. No wheezing or rales.   Abdominal:      General: Bowel sounds are normal. There is no distension.      Palpations: Abdomen is soft. There is no mass.      Tenderness: There is no abdominal tenderness. There is no guarding.     Musculoskeletal:         General: No tenderness (Negative calf tenderness).      Right lower leg: No edema.      Left lower leg: No edema.   Lymphadenopathy:      Cervical: No cervical adenopathy.     Skin:     General: Skin is warm.     Neurological:      Mental Status: She is alert and oriented to person, place, and time.      Cranial Nerves: No cranial nerve deficit.      Deep Tendon Reflexes: Reflexes are normal and symmetric.     Psychiatric:         Mood and Affect: Mood normal.         Behavior: Behavior normal.         Thought Content: Thought content normal.         Judgment: Judgment normal.

## 2025-07-23 NOTE — PROGRESS NOTES
"Adult Annual Physical  Name: Ellyn Santana      : 1995      MRN: [id number]  Encounter Provider: Shanna Trinh MD  Encounter Date: 2025   Encounter department: [facility]    :  Assessment & Plan        Preventive Screenings:    - Cervical cancer screening: screening up-to-date     Immunizations:  - Immunizations due: Hepatitis A         History of Present Illness   {?Quick Links Encounters * My Last Note * Last Note in Specialty * Snapshot * Since Last Visit * History :96186}  Adult Annual Physical:  Patient presents for annual physical.     Diet and Physical Activity:  - Diet/Nutrition: well balanced diet.  - Exercise: walking, 5-7 times a week on average, 30-60 minutes on average and less than 30 minutes on average.    Depression Screening:  - PHQ-2 Score: 0    General Health:  - Sleep: sleeps well and 7-8 hours of sleep on average.  - Hearing: normal hearing bilateral ears.  - Vision: most recent eye exam < 1 year ago, wears contacts and wears glasses.  - Dental: regular dental visits, brushes teeth twice daily and floss regularly.    /GYN Health:  - Follows with GYN: yes.   - Menopause: premenopausal.   - History of STDs: no    Advanced Care Planning:  - Has an advanced directive?: no    - Has a durable medical POA?: no    - ACP document given to patient?: no      Review of Systems  {Select to Display PMH (Optional):52695}    Objective {?Quick Links Trend Vitals * Enter New Vitals * Results Review * Timeline (Adult) * Labs * Imaging * Cardiology * Procedures * Lung Cancer Screening * Surgical eConsent :30525}  Ht 5' 2\" (1.575 m)   BMI 21.22 kg/m²     Physical Exam  {Administrative / Billing Section (Optional):54334}  "

## 2025-07-25 ENCOUNTER — HOSPITAL ENCOUNTER (OUTPATIENT)
Dept: ULTRASOUND IMAGING | Facility: HOSPITAL | Age: 30
End: 2025-07-25
Payer: COMMERCIAL

## 2025-07-25 ENCOUNTER — APPOINTMENT (OUTPATIENT)
Dept: LAB | Facility: HOSPITAL | Age: 30
End: 2025-07-25
Payer: COMMERCIAL

## 2025-07-25 DIAGNOSIS — E01.0 THYROMEGALY: ICD-10-CM

## 2025-07-25 DIAGNOSIS — Z13.89 SCREENING FOR GENITOURINARY CONDITION: ICD-10-CM

## 2025-07-25 DIAGNOSIS — Z13.1 SCREENING FOR DIABETES MELLITUS (DM): ICD-10-CM

## 2025-07-25 LAB
ALBUMIN SERPL BCG-MCNC: 4.4 G/DL (ref 3.5–5)
ALP SERPL-CCNC: 59 U/L (ref 34–104)
ALT SERPL W P-5'-P-CCNC: 12 U/L (ref 7–52)
ANION GAP SERPL CALCULATED.3IONS-SCNC: 5 MMOL/L (ref 4–13)
AST SERPL W P-5'-P-CCNC: 14 U/L (ref 13–39)
BACTERIA UR QL AUTO: ABNORMAL /HPF
BASOPHILS # BLD AUTO: 0.04 THOUSANDS/ÂΜL (ref 0–0.1)
BASOPHILS NFR BLD AUTO: 1 % (ref 0–1)
BILIRUB SERPL-MCNC: 0.51 MG/DL (ref 0.2–1)
BILIRUB UR QL STRIP: NEGATIVE
BUN SERPL-MCNC: 20 MG/DL (ref 5–25)
CALCIUM SERPL-MCNC: 9.5 MG/DL (ref 8.4–10.2)
CHLORIDE SERPL-SCNC: 104 MMOL/L (ref 96–108)
CHOLEST SERPL-MCNC: 194 MG/DL (ref ?–200)
CLARITY UR: CLEAR
CO2 SERPL-SCNC: 31 MMOL/L (ref 21–32)
COLOR UR: YELLOW
CREAT SERPL-MCNC: 0.69 MG/DL (ref 0.6–1.3)
EOSINOPHIL # BLD AUTO: 0.13 THOUSAND/ÂΜL (ref 0–0.61)
EOSINOPHIL NFR BLD AUTO: 3 % (ref 0–6)
ERYTHROCYTE [DISTWIDTH] IN BLOOD BY AUTOMATED COUNT: 12.6 % (ref 11.6–15.1)
EST. AVERAGE GLUCOSE BLD GHB EST-MCNC: 108 MG/DL
GFR SERPL CREATININE-BSD FRML MDRD: 116 ML/MIN/1.73SQ M
GLUCOSE P FAST SERPL-MCNC: 87 MG/DL (ref 65–99)
GLUCOSE UR STRIP-MCNC: NEGATIVE MG/DL
HBA1C MFR BLD: 5.4 %
HCT VFR BLD AUTO: 41.2 % (ref 34.8–46.1)
HDLC SERPL-MCNC: 92 MG/DL
HGB BLD-MCNC: 13.7 G/DL (ref 11.5–15.4)
HGB UR QL STRIP.AUTO: NEGATIVE
IMM GRANULOCYTES # BLD AUTO: 0.01 THOUSAND/UL (ref 0–0.2)
IMM GRANULOCYTES NFR BLD AUTO: 0 % (ref 0–2)
KETONES UR STRIP-MCNC: NEGATIVE MG/DL
LDLC SERPL CALC-MCNC: 94 MG/DL (ref 0–100)
LEUKOCYTE ESTERASE UR QL STRIP: NEGATIVE
LYMPHOCYTES # BLD AUTO: 1.66 THOUSANDS/ÂΜL (ref 0.6–4.47)
LYMPHOCYTES NFR BLD AUTO: 45 % (ref 14–44)
MCH RBC QN AUTO: 29.8 PG (ref 26.8–34.3)
MCHC RBC AUTO-ENTMCNC: 33.3 G/DL (ref 31.4–37.4)
MCV RBC AUTO: 90 FL (ref 82–98)
MONOCYTES # BLD AUTO: 0.43 THOUSAND/ÂΜL (ref 0.17–1.22)
MONOCYTES NFR BLD AUTO: 11 % (ref 4–12)
NEUTROPHILS # BLD AUTO: 1.53 THOUSANDS/ÂΜL (ref 1.85–7.62)
NEUTS SEG NFR BLD AUTO: 40 % (ref 43–75)
NITRITE UR QL STRIP: NEGATIVE
NON-SQ EPI CELLS URNS QL MICRO: ABNORMAL /HPF
NONHDLC SERPL-MCNC: 102 MG/DL
NRBC BLD AUTO-RTO: 0 /100 WBCS
PH UR STRIP.AUTO: 7 [PH]
PLATELET # BLD AUTO: 228 THOUSANDS/UL (ref 149–390)
PMV BLD AUTO: 10.7 FL (ref 8.9–12.7)
POTASSIUM SERPL-SCNC: 4.2 MMOL/L (ref 3.5–5.3)
PROT SERPL-MCNC: 7 G/DL (ref 6.4–8.4)
PROT UR STRIP-MCNC: NEGATIVE MG/DL
RBC # BLD AUTO: 4.6 MILLION/UL (ref 3.81–5.12)
RBC #/AREA URNS AUTO: ABNORMAL /HPF
SODIUM SERPL-SCNC: 140 MMOL/L (ref 135–147)
SP GR UR STRIP.AUTO: 1 (ref 1–1.04)
TRIGL SERPL-MCNC: 38 MG/DL (ref ?–150)
TSH SERPL DL<=0.05 MIU/L-ACNC: 1.92 UIU/ML (ref 0.45–4.5)
UROBILINOGEN UA: NEGATIVE MG/DL
WBC # BLD AUTO: 3.8 THOUSAND/UL (ref 4.31–10.16)
WBC #/AREA URNS AUTO: ABNORMAL /HPF

## 2025-07-25 PROCEDURE — 76536 US EXAM OF HEAD AND NECK: CPT

## 2025-07-25 PROCEDURE — 80061 LIPID PANEL: CPT | Performed by: INTERNAL MEDICINE

## 2025-07-25 PROCEDURE — 80053 COMPREHEN METABOLIC PANEL: CPT | Performed by: INTERNAL MEDICINE

## 2025-07-25 PROCEDURE — 85025 COMPLETE CBC W/AUTO DIFF WBC: CPT | Performed by: INTERNAL MEDICINE

## 2025-07-25 PROCEDURE — 84443 ASSAY THYROID STIM HORMONE: CPT | Performed by: INTERNAL MEDICINE

## 2025-07-25 PROCEDURE — 81001 URINALYSIS AUTO W/SCOPE: CPT

## 2025-07-25 PROCEDURE — 36415 COLL VENOUS BLD VENIPUNCTURE: CPT | Performed by: INTERNAL MEDICINE

## 2025-07-25 PROCEDURE — 83036 HEMOGLOBIN GLYCOSYLATED A1C: CPT

## (undated) DEVICE — GAUZE SPONGES,16 PLY: Brand: CURITY

## (undated) DEVICE — SUT STRATAFIX SPIRAL 4-0 PGA/PCL 30 X 30 CM SXMD2B409

## (undated) DEVICE — EXOFIN PRECISION PEN HIGH VISCOSITY TOPICAL SKIN ADHESIVE: Brand: EXOFIN PRECISION PEN, 1G

## (undated) DEVICE — SKIN MARKER DUAL TIP WITH RULER CAP, FLEXIBLE RULER AND LABELS: Brand: DEVON

## (undated) DEVICE — SUT MONOCRYL 0 CTX 36 IN Y398H

## (undated) DEVICE — PACK C-SECTION PBDS

## (undated) DEVICE — TELFA NON-ADHERENT ABSORBENT DRESSING: Brand: TELFA

## (undated) DEVICE — SUT VICRYL 0 CT-1 27 IN J260H

## (undated) DEVICE — ABDOMINAL PAD: Brand: DERMACEA

## (undated) DEVICE — GLOVE INDICATOR PI UNDERGLOVE SZ 7 BLUE

## (undated) DEVICE — GLOVE PI ULTRA TOUCH SZ.7.0

## (undated) DEVICE — Device

## (undated) DEVICE — CHLORAPREP HI-LITE 26ML ORANGE